# Patient Record
Sex: FEMALE | Employment: FULL TIME | URBAN - METROPOLITAN AREA
[De-identification: names, ages, dates, MRNs, and addresses within clinical notes are randomized per-mention and may not be internally consistent; named-entity substitution may affect disease eponyms.]

---

## 2020-10-05 ENCOUNTER — TELEPHONE (OUTPATIENT)
Dept: ONCOLOGY | Age: 55
End: 2020-10-05

## 2020-10-05 NOTE — TELEPHONE ENCOUNTER
Left voicemail advising patient that ultrasound is scheduled on Thursday 10/8/2020 at 2:45 pm with an arrival time of 2:15 pm. Requested patient contact the office to confirm appointment.

## 2020-10-08 ENCOUNTER — HOSPITAL ENCOUNTER (OUTPATIENT)
Dept: ULTRASOUND IMAGING | Age: 55
Discharge: HOME | End: 2020-10-08
Attending: OBSTETRICS & GYNECOLOGY
Payer: COMMERCIAL

## 2020-10-08 DIAGNOSIS — N95.0 POSTMENOPAUSAL BLEEDING: ICD-10-CM

## 2020-10-08 PROCEDURE — 76830 TRANSVAGINAL US NON-OB: CPT

## 2020-10-14 ENCOUNTER — TELEPHONE (OUTPATIENT)
Dept: ONCOLOGY | Age: 55
End: 2020-10-14

## 2020-10-14 NOTE — TELEPHONE ENCOUNTER
Received a call from [employer] with patients final results from recent biopsy. Arslan Nowak from the facility stated she would fax over patients findings today. Confirmed fax number.

## 2020-10-15 ENCOUNTER — TELEPHONE (OUTPATIENT)
Dept: ONCOLOGY | Age: 55
End: 2020-10-15

## 2020-10-23 ENCOUNTER — OFFICE VISIT (OUTPATIENT)
Dept: ONCOLOGY | Age: 55
End: 2020-10-23
Payer: COMMERCIAL

## 2020-10-23 ENCOUNTER — TRANSCRIBE ORDER (OUTPATIENT)
Dept: REGISTRATION | Age: 55
End: 2020-10-23

## 2020-10-23 ENCOUNTER — HOSPITAL ENCOUNTER (OUTPATIENT)
Dept: LAB | Age: 55
Discharge: HOME | End: 2020-10-23
Payer: COMMERCIAL

## 2020-10-23 ENCOUNTER — HOSPITAL ENCOUNTER (OUTPATIENT)
Dept: PREADMISSION TESTING | Age: 55
Discharge: HOME | End: 2020-10-23
Payer: COMMERCIAL

## 2020-10-23 VITALS
OXYGEN SATURATION: 98 % | BODY MASS INDEX: 48.82 KG/M2 | DIASTOLIC BLOOD PRESSURE: 80 MMHG | HEIGHT: 65 IN | HEART RATE: 71 BPM | WEIGHT: 293 LBS | SYSTOLIC BLOOD PRESSURE: 138 MMHG | TEMPERATURE: 97 F | RESPIRATION RATE: 14 BRPM

## 2020-10-23 DIAGNOSIS — C54.1 ENDOMETRIAL SARCOMA (HCC): ICD-10-CM

## 2020-10-23 DIAGNOSIS — E66.01 MORBID OBESITY (HCC): Primary | ICD-10-CM

## 2020-10-23 DIAGNOSIS — E66.01 OBESITY, MORBID (HCC): ICD-10-CM

## 2020-10-23 DIAGNOSIS — C54.1 ENDOMETRIAL CANCER (HCC): ICD-10-CM

## 2020-10-23 DIAGNOSIS — C54.1 ENDOMETRIAL CANCER (HCC): Primary | ICD-10-CM

## 2020-10-23 LAB
ABO + RH BLD: NORMAL
ATRIAL RATE: 73 BPM
BLOOD GROUP ANTIBODIES SERPL: NORMAL
CALCULATED P AXIS, ECG09: 62 DEGREES
CALCULATED R AXIS, ECG10: 72 DEGREES
CALCULATED T AXIS, ECG11: 44 DEGREES
DIAGNOSIS, 93000: NORMAL
P-R INTERVAL, ECG05: 152 MS
Q-T INTERVAL, ECG07: 434 MS
QRS DURATION, ECG06: 72 MS
QTC CALCULATION (BEZET), ECG08: 478 MS
SENTARA SPECIMEN COL,SENBCF: NORMAL
SPECIMEN EXP DATE BLD: NORMAL
VENTRICULAR RATE, ECG03: 73 BPM

## 2020-10-23 PROCEDURE — 99214 OFFICE O/P EST MOD 30 MIN: CPT | Performed by: OBSTETRICS & GYNECOLOGY

## 2020-10-23 PROCEDURE — 99001 SPECIMEN HANDLING PT-LAB: CPT

## 2020-10-23 PROCEDURE — 93005 ELECTROCARDIOGRAM TRACING: CPT

## 2020-10-23 PROCEDURE — 86900 BLOOD TYPING SEROLOGIC ABO: CPT

## 2020-10-23 RX ORDER — METAPROTERENOL SULFATE 20 MG
TABLET ORAL
COMMUNITY

## 2020-10-23 RX ORDER — SPIRONOLACTONE 25 MG
TABLET ORAL
COMMUNITY

## 2020-10-23 NOTE — H&P (VIEW-ONLY)
[address], [address], [address] [facility] 
[address] [facility], [address] [place], [address] 
 [phone] Abby Bosworth DO Patient ID: 
Name:  Anahi Landon MRN:  [id number] :  1965/54 y.o. Date:  10/23/2020 HISTORY OF PRESENT ILLNESS: 
Anahi Landon is a 62 y.o.  postmenopausal female [facility] by [facility] post[facility] bleeding. Pt states she developed spotting in February/march. Was set up to see gyn but with COVID she was unable. Had EMB and ultrasound and here to review results. Still having some bleeding. Labs: 
Pathology Specimen A-Endometrial Biopsy, endometrium: WELL TO  
MODERATELY DIFFERENTIATED ENDOMETRIOID ADENOCARCINOMA WITH  
SQUAMOUS METAPLASIA (FIGO I-II). Imaging TVUS 
FINDINGS: Multiple images from a transabdominal and endovaginal pelvic 
ultrasound study. 
  
The uterus measures 15.3 x 15.4 x 15.3 cm. Uterine fibroid at the lower uterine 
segment measures 5.1 x 3.6 x 3.6 cm. Posterior fibroid of the body measures 5.1 
x 4.0 x 4.5 cm. 
  
Visualization of the endometrium is limited. Visualized portions measure up to 
1.7 cm. 
  
Right and left ovaries not seen. No adnexal masses. No free fluid in the pelvis. 
  
IMPRESSION IMPRESSION: 
  
Fibroid uterus. 
  
Thickened endometrium in the visualized portions. Correlate for endometrial 
hyperplasia or malignancy. 
  
Nonvisualization of the ovaries could be due to positioning, absence, or limited 
visualization due to bowel gas. No adnexal mass. ROS:  
As above Patient Active Problem List  
 Diagnosis Date Noted  Obesity, morbid ([facility] [address] 10/23/2020  S/P appendectomy 2011  Fibroid, uterine 2011  Colon polyps 2010  
 HTN (hypertension) 2010  DUB (dysfunctional uterine bleeding) 2010  Obesity  Hemorrhoid Past Medical History:  
Diagnosis Date  Abdominal pain LLQ  >> GI  
 Elevated blood pressure  Hypertension  Obesity Past Surgical History:  
Procedure Laterality Date  HX APPENDECTOMY  2011 Dr. Nadine Rae  HX GI    
 HX GYN    
 BTL  
 HX GYN    
   HX GYN    
 etopic pregnancy  HX HEENT Tonsilectomy OB History No obstetric history on file. Social History Tobacco Use  Smoking status: Current Every Day Smoker Packs/day: 1.00  Smokeless tobacco: Never Used  Tobacco comment: 4 months Quit for 3 years Substance Use Topics  Alcohol use: No  
  
Family History Problem Relation Age of Onset  Hypertension Mother  Cancer Mother Pancreatic  Colon Cancer Sister 36 Current Outpatient Medications Medication Sig  potassium 99 mg tablet Take 99 mg by mouth daily.  Vjmswkys-Mdct-Tzfjwi-Hyalur Ac 026-858-64-2 mg cap Take  by mouth.  Calcium-Cholecalciferol, D3, (Calcium 600 with Vitamin D3) 600 mg(1,500mg) -400 unit chew Take  by mouth.  lisinopril (PRINIVIL, ZESTRIL) 10 mg tablet Take 1 Tab by mouth two (2) times a day. No current facility-administered medications for this visit. Allergies Allergen Reactions  Pcn [Penicillins] Other (comments) Paralyzes Left side OBJECTIVE: 
 
Physical Exam 
VITAL SIGNS: Visit Vitals /80 (BP 1 Location: Left arm, BP Patient Position: Sitting) Pulse 71 Temp 97 °F (36.1 °C) (Temporal) Resp 14 Ht 5' 5\" (1.651 m) Wt 138.8 kg (306 lb) SpO2 98% BMI 50.92 kg/m² GENERAL ROSIBEL: in no apparent distress and well developed and well nourished IMPRESSION/PLAN: 
1.  Grade 1-2  Endometrial Cancer 
 -reviewed her pathology and imaging and discussed likelihood that it is confined to uterus and favorable prognosis 
 -discussed recommendation for hysterectomy/bso sentinel lymph node biopsy -discussed robotic approach but explained given size of uterus there is good change she may need larger incision 
 -briefly discussed possible although unlikely need for adjuvant treatment 
 -Risks, benefits and alternatives of surgery discussed in detail 
 -all of patients questions and concerns addressed The total time spent was 60 minutes regarding this patients diagnosis of endometrial cancer and >50% of this time was spent counseling and coordinating care Renu Chowdhury DO Gynecologic Oncology 10/23/414018:47 AM

## 2020-10-23 NOTE — PROGRESS NOTES
[facility]  [address] [place] [address], [address], [address] [facility]  [address] [facility], [address] [facility] [place]  [place], [address]   [phone]  Tosha Solomon DO      Patient ID:  Name:  Pauline Metz  MRN:  [id number]  :  1965/54 y.o. Date:  10/23/2020      HISTORY OF PRESENT ILLNESS:  Pauline Metz is a 62 y.o.  postmenopausal female [facility] by [facility] for postmenopausal bleeding. Pt states she developed spotting in February/march. Was set up to see gyn but with COVID she was unable. Had EMB and ultrasound and here to review results. Still having some bleeding. Labs:  Pathology  Specimen A-Endometrial Biopsy, endometrium: WELL TO   MODERATELY DIFFERENTIATED ENDOMETRIOID ADENOCARCINOMA WITH   SQUAMOUS METAPLASIA (FIGO I-II). Imaging  TVUS  FINDINGS: Multiple images from a transabdominal and endovaginal pelvic  ultrasound study.     The uterus measures 15.3 x 15.4 x 15.3 cm. Uterine fibroid at the lower uterine  segment measures 5.1 x 3.6 x 3.6 cm. Posterior fibroid of the body measures 5.1  x 4.0 x 4.5 cm.     Visualization of the endometrium is limited. Visualized portions measure up to  1.7 cm.     Right and left ovaries not seen. No adnexal masses. No free fluid in the pelvis.     IMPRESSION  IMPRESSION:     Fibroid uterus.     Thickened endometrium in the visualized portions. Correlate for endometrial  hyperplasia or malignancy.     Nonvisualization of the ovaries could be due to positioning, absence, or limited  visualization due to bowel gas. No adnexal mass.        ROS:   As above      Patient Active Problem List    Diagnosis Date Noted    Obesity, morbid ([address] 10/23/2020    S/P appendectomy 2011    Fibroid, uterine 2011    Colon polyps 2010    HTN (hypertension) 2010    DUB (dysfunctional uterine bleeding) 2010    Obesity     Hemorrhoid      Past Medical History:   Diagnosis Date    Abdominal pain     LLQ  >> GI    Elevated blood pressure     Hypertension     Obesity       Past Surgical History:   Procedure Laterality Date    HX APPENDECTOMY  2011    Dr. Gretchen Marcial    HX GI      HX GYN      BTL    HX GYN          HX GYN      etopic pregnancy    HX HEENT      Tonsilectomy      OB History    No obstetric history on file. Social History     Tobacco Use    Smoking status: Current Every Day Smoker     Packs/day: 1.00    Smokeless tobacco: Never Used    Tobacco comment: 4 months Quit for 3 years   Substance Use Topics    Alcohol use: No      Family History   Problem Relation Age of Onset    Hypertension Mother     Cancer Mother         Pancreatic     Colon Cancer Sister 36      Current Outpatient Medications   Medication Sig    potassium 99 mg tablet Take 99 mg by mouth daily.  Kzzdvrbm-Waol-Nvnqjx-Hyalur Ac [id number] mg cap Take  by mouth.  Calcium-Cholecalciferol, D3, (Calcium 600 with Vitamin D3) 600 mg(1,500mg) -400 unit chew Take  by mouth.  lisinopril (PRINIVIL, ZESTRIL) 10 mg tablet Take 1 Tab by mouth two (2) times a day. No current facility-administered medications for this visit.       Allergies   Allergen Reactions    Pcn [Penicillins] Other (comments)     Paralyzes Left side           OBJECTIVE:    Physical Exam  VITAL SIGNS: Visit Vitals  /80 (BP 1 Location: Left arm, BP Patient Position: Sitting)   Pulse 71   Temp 97 °F (36.1 °C) (Temporal)   Resp 14   Ht 5' 5\" (1.651 m)   Wt 138.8 kg (306 lb)   SpO2 98%   BMI 50.92 kg/m²      GENERAL ROSIBEL: in no apparent distress and well developed and well nourished           IMPRESSION/PLAN:  1.  Grade 1-2  Endometrial Cancer   -reviewed her pathology and imaging and discussed likelihood that it is confined to uterus and favorable prognosis   -discussed recommendation for hysterectomy/bso sentinel lymph node biopsy   -discussed robotic approach but explained given size of uterus there is good change she may need larger incision   -briefly discussed possible although unlikely need for adjuvant treatment   -Risks, benefits and alternatives of surgery discussed in detail   -all of patients questions and concerns addressed            The total time spent was 60 minutes regarding this patients diagnosis of endometrial cancer and >50% of this time was spent counseling and coordinating care    [facility] On[facility]  10/23/687105:47 AM

## 2020-10-23 NOTE — PROGRESS NOTES
Sima Benitez is a 62 y.o. female presents in office for test results, she is accompanied by her . Chief Complaint   Patient presents with    Results      Pt c/o back spasms one day this week. Do you have any unusual vaginal bleeding, discharge or irritation? Pt c/o vaginal spotting, using panty liners. Do you have any changes in your bowel movements? No  Have you been experiencing nausea or vomiting? No  Have you been experiencing any continuous or worsening abdominal pain? No  Any urinary burning? No    Visit Vitals  /80 (BP 1 Location: Left arm, BP Patient Position: Sitting)   Pulse 71   Temp 97 °F (36.1 °C) (Temporal)   Resp 14   Ht 5' 5\" (1.651 m)   Wt 138.8 kg (306 lb)   SpO2 98%   BMI 50.92 kg/m²         1. Have you been to the ER, urgent care clinic since your last visit? Hospitalized since your last visit? No    2. Have you seen or consulted any other health care providers outside of the [address] [place] [address] since your last visit? Include any pap smears or colon screening.  No    3 most recent PHQ Screens 10/23/2020   Little interest or pleasure in doing things Not at all   Feeling down, depressed, irritable, or hopeless Not at all   Total Score PHQ 2 0         Learning Assessment 10/23/2020   PRIMARY LEARNER Patient   BARRIERS PRIMARY LEARNER NONE   CO-LEARNER CAREGIVER No   PRIMARY LANGUAGE ENGLISH   LEARNER PREFERENCE PRIMARY DEMONSTRATION   ANSWERED BY Patient   RELATIONSHIP SELF

## 2020-10-23 NOTE — LETTER
10/23/20 Patient: Leisa Chaudhry YOB: 1965 Date of Visit: 10/23/2020 Aretha Welch MD 
[address] [place] [id number] VIA Facsimile: [phone] Dear Aretha Welch MD, [facility]. My notes for this consultation are attached. If you have questions, please do not hesitate to call me. I look forward to following your patient along with you. Sincerely, Nilay Moreno MD

## 2020-10-25 LAB
A-G RATIO,AGRAT: 1.7 RATIO (ref 1.1–2.6)
ALBUMIN SERPL-MCNC: 4.2 G/DL (ref 3.5–5)
ALP SERPL-CCNC: 63 U/L (ref 25–115)
ALT SERPL-CCNC: 14 U/L (ref 5–40)
ANION GAP SERPL CALC-SCNC: 12.8 MMOL/L (ref 3–15)
AST SERPL W P-5'-P-CCNC: 18 U/L (ref 10–37)
AVG GLU, 10930: 123 MG/DL (ref 91–123)
BILIRUB SERPL-MCNC: 0.1 MG/DL (ref 0.2–1.2)
BUN SERPL-MCNC: 14 MG/DL (ref 6–22)
CALCIUM SERPL-MCNC: 9.1 MG/DL (ref 8.4–10.5)
CHLORIDE SERPL-SCNC: 101 MMOL/L (ref 98–110)
CO2 SERPL-SCNC: 28 MMOL/L (ref 20–32)
CREAT SERPL-MCNC: 1.1 MG/DL (ref 0.5–1.2)
ERYTHROCYTE [DISTWIDTH] IN BLOOD BY AUTOMATED COUNT: 15.8 % (ref 10–15.5)
GFRAA, 66117: 59.8
GFRNA, 66118: 49.3
GLOBULIN,GLOB: 2.5 G/DL (ref 2–4)
GLUCOSE SERPL-MCNC: 116 MG/DL (ref 70–99)
HBA1C MFR BLD HPLC: 5.9 % (ref 4.8–5.6)
HCT VFR BLD AUTO: 42.9 % (ref 35.1–48)
HGB BLD-MCNC: 13.3 G/DL (ref 11.7–16)
MCH RBC QN AUTO: 31 PG (ref 26–34)
MCHC RBC AUTO-ENTMCNC: 31 G/DL (ref 31–36)
MCV RBC AUTO: 101 FL (ref 81–99)
PLATELET # BLD AUTO: 382 K/UL (ref 140–440)
PMV BLD AUTO: 10.2 FL (ref 9–13)
POTASSIUM SERPL-SCNC: 3.9 MMOL/L (ref 3.5–5.5)
PROT SERPL-MCNC: 6.7 G/DL (ref 6.4–8.3)
RBC # BLD AUTO: 4.25 M/UL (ref 3.8–5.2)
SODIUM SERPL-SCNC: 142 MMOL/L (ref 133–145)
WBC # BLD AUTO: 6.9 K/UL (ref 4–11)

## 2020-10-30 ENCOUNTER — HOSPITAL ENCOUNTER (OUTPATIENT)
Dept: PREADMISSION TESTING | Age: 55
Discharge: HOME | End: 2020-10-30
Payer: COMMERCIAL

## 2020-10-30 VITALS — HEIGHT: 65 IN | BODY MASS INDEX: 48.82 KG/M2 | WEIGHT: 293 LBS

## 2020-10-30 PROCEDURE — 87635 SARS-COV-2 COVID-19 AMP PRB: CPT

## 2020-10-30 RX ORDER — ASPIRIN 325 MG
325 TABLET ORAL DAILY
COMMUNITY

## 2020-10-30 RX ORDER — HEPARIN SODIUM 5000 [USP'U]/ML
5000 INJECTION, SOLUTION INTRAVENOUS; SUBCUTANEOUS ONCE
Status: CANCELLED | OUTPATIENT
Start: 2020-10-30 | End: 2020-10-30

## 2020-10-30 RX ORDER — GENTAMICIN SULFATE 60 MG/50ML
120 INJECTION, SOLUTION INTRAVENOUS ONCE
Status: CANCELLED | OUTPATIENT
Start: 2020-10-30 | End: 2020-10-30

## 2020-10-30 RX ORDER — SODIUM CHLORIDE, SODIUM LACTATE, POTASSIUM CHLORIDE, CALCIUM CHLORIDE 600; 310; 30; 20 MG/100ML; MG/100ML; MG/100ML; MG/100ML
125 INJECTION, SOLUTION INTRAVENOUS CONTINUOUS
Status: CANCELLED | OUTPATIENT
Start: 2020-10-30

## 2020-10-30 NOTE — PERIOP NOTES
Had covid test today. Will self quarantine until after surgery. Aware to use CHG and be NPO. Will refrain from tobacco use 24hrs prior to [place].  Aware to pack light bag and leave valuables at home

## 2020-10-31 LAB — SARS-COV-2, COV2NT: NOT DETECTED

## 2020-11-02 ENCOUNTER — OFFICE VISIT (OUTPATIENT)
Dept: ONCOLOGY | Age: 55
End: 2020-11-02

## 2020-11-02 DIAGNOSIS — Z71.89 SURGICAL COUNSELING VISIT: Primary | ICD-10-CM

## 2020-11-02 NOTE — PROGRESS NOTES
Reviewed information packet for a Robotic assisted laparoscopic hysterectomy, bilateral salpingo-oophorectomy scheduled on 11/5/2020 at 9:30 am with an arrival time of 7:30 am. Patient was given information packet that included pre-op and post-op instructions as well as the scheduled date, start time, arrival time, and length of the surgery and signed the consent form. Patient expressed understanding and had no further questions. Patient was encouraged to call if they have questions later. The patient was counseled at length about the risks of annabel Covid-19 during their perioperative period and any recovery window from their procedure. The patient was made aware that annabel Covid-19  may worsen their prognosis for recovering from their procedure and lend to a higher morbidity and/or mortality risk. All material risks, benefits, and reasonable alternatives including postponing the procedure were discussed. The patient does wish to proceed with the procedure at this time.

## 2020-11-04 ENCOUNTER — TELEPHONE (OUTPATIENT)
Dept: ONCOLOGY | Age: 55
End: 2020-11-04

## 2020-11-05 ENCOUNTER — ANESTHESIA (OUTPATIENT)
Dept: SURGERY | Age: 55
End: 2020-11-05
Payer: COMMERCIAL

## 2020-11-05 ENCOUNTER — HOSPITAL ENCOUNTER (OUTPATIENT)
Age: 55
Setting detail: OBSERVATION
Discharge: HOME | End: 2020-11-07
Attending: OBSTETRICS & GYNECOLOGY | Admitting: OBSTETRICS & GYNECOLOGY
Payer: COMMERCIAL

## 2020-11-05 ENCOUNTER — ANESTHESIA EVENT (OUTPATIENT)
Dept: SURGERY | Age: 55
End: 2020-11-05
Payer: COMMERCIAL

## 2020-11-05 DIAGNOSIS — G89.18 POST-OPERATIVE PAIN: Primary | ICD-10-CM

## 2020-11-05 DIAGNOSIS — G89.18 POSTOPERATIVE PAIN: Primary | ICD-10-CM

## 2020-11-05 DIAGNOSIS — C54.1 ENDOMETRIAL CANCER (HCC): ICD-10-CM

## 2020-11-05 LAB — GLUCOSE BLD STRIP.AUTO-MCNC: 103 MG/DL (ref 70–110)

## 2020-11-05 PROCEDURE — 88360 TUMOR IMMUNOHISTOCHEM/MANUAL: CPT

## 2020-11-05 PROCEDURE — C9290 INJ, BUPIVACAINE LIPOSOME: HCPCS | Performed by: OBSTETRICS & GYNECOLOGY

## 2020-11-05 PROCEDURE — 77030040830 HC CATH URETH FOL MDII -A: Performed by: OBSTETRICS & GYNECOLOGY

## 2020-11-05 PROCEDURE — 82962 GLUCOSE BLOOD TEST: CPT

## 2020-11-05 PROCEDURE — 77010033678 HC OXYGEN DAILY

## 2020-11-05 PROCEDURE — 2709999900 HC NON-CHARGEABLE SUPPLY: Performed by: OBSTETRICS & GYNECOLOGY

## 2020-11-05 PROCEDURE — 77030011264 HC ELECTRD BLD EXT COVD -A: Performed by: OBSTETRICS & GYNECOLOGY

## 2020-11-05 PROCEDURE — 77030002966 HC SUT PDS J&J -A: Performed by: OBSTETRICS & GYNECOLOGY

## 2020-11-05 PROCEDURE — 88307 TISSUE EXAM BY PATHOLOGIST: CPT

## 2020-11-05 PROCEDURE — 74011000250 HC RX REV CODE- 250: Performed by: NURSE ANESTHETIST, CERTIFIED REGISTERED

## 2020-11-05 PROCEDURE — 99218 HC RM OBSERVATION: CPT

## 2020-11-05 PROCEDURE — 74011250637 HC RX REV CODE- 250/637: Performed by: OBSTETRICS & GYNECOLOGY

## 2020-11-05 PROCEDURE — 77030002933 HC SUT MCRYL J&J -A: Performed by: OBSTETRICS & GYNECOLOGY

## 2020-11-05 PROCEDURE — 77030035277 HC OBTRTR BLDELSS DISP INTU -B: Performed by: OBSTETRICS & GYNECOLOGY

## 2020-11-05 PROCEDURE — 88331 PATH CONSLTJ SURG 1 BLK 1SPC: CPT

## 2020-11-05 PROCEDURE — 38571 LAPAROSCOPY LYMPHADENECTOMY: CPT | Performed by: OBSTETRICS & GYNECOLOGY

## 2020-11-05 PROCEDURE — 88342 IMHCHEM/IMCYTCHM 1ST ANTB: CPT

## 2020-11-05 PROCEDURE — 77030010031 HC SCIS ENDOSC MPLR J&J -C: Performed by: OBSTETRICS & GYNECOLOGY

## 2020-11-05 PROCEDURE — 58573 TLH W/T/O UTERUS OVER 250 G: CPT | Performed by: OBSTETRICS & GYNECOLOGY

## 2020-11-05 PROCEDURE — 77030026102 HC DEV TISS ENSEAL G2 J&J -F: Performed by: OBSTETRICS & GYNECOLOGY

## 2020-11-05 PROCEDURE — 76010000877 HC OR TIME 2.5 TO 3HR INTENSV - TIER 2: Performed by: OBSTETRICS & GYNECOLOGY

## 2020-11-05 PROCEDURE — 88309 TISSUE EXAM BY PATHOLOGIST: CPT

## 2020-11-05 PROCEDURE — 77030025805 HC MANIP UTER RUMI COOP -B: Performed by: OBSTETRICS & GYNECOLOGY

## 2020-11-05 PROCEDURE — 77030006643: Performed by: ANESTHESIOLOGY

## 2020-11-05 PROCEDURE — 77030020703 HC SEAL CANN DISP INTU -B: Performed by: OBSTETRICS & GYNECOLOGY

## 2020-11-05 PROCEDURE — 74011000254 HC RX REV CODE- 254: Performed by: OBSTETRICS & GYNECOLOGY

## 2020-11-05 PROCEDURE — 77030008477 HC STYL SATN SLP COVD -A: Performed by: ANESTHESIOLOGY

## 2020-11-05 PROCEDURE — 74011250636 HC RX REV CODE- 250/636: Performed by: ANESTHESIOLOGY

## 2020-11-05 PROCEDURE — 88341 IMHCHEM/IMCYTCHM EA ADD ANTB: CPT

## 2020-11-05 PROCEDURE — 77030014064 HC TIP MANIP UTER COOP -C: Performed by: OBSTETRICS & GYNECOLOGY

## 2020-11-05 PROCEDURE — 74011250636 HC RX REV CODE- 250/636: Performed by: NURSE ANESTHETIST, CERTIFIED REGISTERED

## 2020-11-05 PROCEDURE — 77030040361 HC SLV COMPR DVT MDII -B: Performed by: OBSTETRICS & GYNECOLOGY

## 2020-11-05 PROCEDURE — 74011250636 HC RX REV CODE- 250/636: Performed by: OBSTETRICS & GYNECOLOGY

## 2020-11-05 PROCEDURE — 77030008462 HC STPLR SKN PROX J&J -A: Performed by: OBSTETRICS & GYNECOLOGY

## 2020-11-05 PROCEDURE — 76060000036 HC ANESTHESIA 2.5 TO 3 HR: Performed by: OBSTETRICS & GYNECOLOGY

## 2020-11-05 PROCEDURE — 77030037241 HC PRT ACC BLDLSS AIRSEAL CNMD -B: Performed by: OBSTETRICS & GYNECOLOGY

## 2020-11-05 PROCEDURE — 77030020782 HC GWN BAIR PAWS FLX 3M -B: Performed by: OBSTETRICS & GYNECOLOGY

## 2020-11-05 PROCEDURE — 74011000250 HC RX REV CODE- 250: Performed by: OBSTETRICS & GYNECOLOGY

## 2020-11-05 PROCEDURE — 77030031139 HC SUT VCRL2 J&J -A: Performed by: OBSTETRICS & GYNECOLOGY

## 2020-11-05 PROCEDURE — 77030008574 HC TBNG SUC IRR STRY -B: Performed by: OBSTETRICS & GYNECOLOGY

## 2020-11-05 PROCEDURE — 88112 CYTOPATH CELL ENHANCE TECH: CPT

## 2020-11-05 PROCEDURE — 77030008683 HC TU ET CUF COVD -A: Performed by: ANESTHESIOLOGY

## 2020-11-05 PROCEDURE — 74011000272 HC RX REV CODE- 272: Performed by: OBSTETRICS & GYNECOLOGY

## 2020-11-05 PROCEDURE — 77030016151 HC PROTCTR LNS DFOG COVD -B: Performed by: OBSTETRICS & GYNECOLOGY

## 2020-11-05 PROCEDURE — 76210000006 HC OR PH I REC 0.5 TO 1 HR: Performed by: OBSTETRICS & GYNECOLOGY

## 2020-11-05 RX ORDER — FLUMAZENIL 0.1 MG/ML
0.2 INJECTION INTRAVENOUS
Status: DISCONTINUED | OUTPATIENT
Start: 2020-11-05 | End: 2020-11-05 | Stop reason: HOSPADM

## 2020-11-05 RX ORDER — ROCURONIUM BROMIDE 10 MG/ML
INJECTION, SOLUTION INTRAVENOUS AS NEEDED
Status: DISCONTINUED | OUTPATIENT
Start: 2020-11-05 | End: 2020-11-05 | Stop reason: HOSPADM

## 2020-11-05 RX ORDER — ONDANSETRON 2 MG/ML
INJECTION INTRAMUSCULAR; INTRAVENOUS AS NEEDED
Status: DISCONTINUED | OUTPATIENT
Start: 2020-11-05 | End: 2020-11-05 | Stop reason: HOSPADM

## 2020-11-05 RX ORDER — GENTAMICIN SULFATE 60 MG/50ML
120 INJECTION, SOLUTION INTRAVENOUS ONCE
Status: COMPLETED | OUTPATIENT
Start: 2020-11-05 | End: 2020-11-05

## 2020-11-05 RX ORDER — ENOXAPARIN SODIUM 100 MG/ML
40 INJECTION SUBCUTANEOUS EVERY 24 HOURS
Status: DISCONTINUED | OUTPATIENT
Start: 2020-11-06 | End: 2020-11-07 | Stop reason: HOSPADM

## 2020-11-05 RX ORDER — CLINDAMYCIN PHOSPHATE 900 MG/50ML
900 INJECTION, SOLUTION INTRAVENOUS ONCE
Status: COMPLETED | OUTPATIENT
Start: 2020-11-05 | End: 2020-11-05

## 2020-11-05 RX ORDER — SODIUM CHLORIDE 0.9 % (FLUSH) 0.9 %
5-40 SYRINGE (ML) INJECTION EVERY 8 HOURS
Status: DISCONTINUED | OUTPATIENT
Start: 2020-11-05 | End: 2020-11-07 | Stop reason: HOSPADM

## 2020-11-05 RX ORDER — EPHEDRINE SULFATE/0.9% NACL/PF 50 MG/5 ML
SYRINGE (ML) INTRAVENOUS AS NEEDED
Status: DISCONTINUED | OUTPATIENT
Start: 2020-11-05 | End: 2020-11-05 | Stop reason: HOSPADM

## 2020-11-05 RX ORDER — SODIUM CHLORIDE 0.9 % (FLUSH) 0.9 %
5-40 SYRINGE (ML) INJECTION AS NEEDED
Status: DISCONTINUED | OUTPATIENT
Start: 2020-11-05 | End: 2020-11-07 | Stop reason: HOSPADM

## 2020-11-05 RX ORDER — LIDOCAINE HYDROCHLORIDE 20 MG/ML
INJECTION, SOLUTION EPIDURAL; INFILTRATION; INTRACAUDAL; PERINEURAL AS NEEDED
Status: DISCONTINUED | OUTPATIENT
Start: 2020-11-05 | End: 2020-11-05 | Stop reason: HOSPADM

## 2020-11-05 RX ORDER — SODIUM CHLORIDE, SODIUM LACTATE, POTASSIUM CHLORIDE, CALCIUM CHLORIDE 600; 310; 30; 20 MG/100ML; MG/100ML; MG/100ML; MG/100ML
125 INJECTION, SOLUTION INTRAVENOUS CONTINUOUS
Status: DISCONTINUED | OUTPATIENT
Start: 2020-11-05 | End: 2020-11-07 | Stop reason: HOSPADM

## 2020-11-05 RX ORDER — FENTANYL CITRATE 50 UG/ML
INJECTION, SOLUTION INTRAMUSCULAR; INTRAVENOUS AS NEEDED
Status: DISCONTINUED | OUTPATIENT
Start: 2020-11-05 | End: 2020-11-05 | Stop reason: HOSPADM

## 2020-11-05 RX ORDER — NALOXONE HYDROCHLORIDE 0.4 MG/ML
0.4 INJECTION, SOLUTION INTRAMUSCULAR; INTRAVENOUS; SUBCUTANEOUS AS NEEDED
Status: DISCONTINUED | OUTPATIENT
Start: 2020-11-05 | End: 2020-11-07 | Stop reason: HOSPADM

## 2020-11-05 RX ORDER — OXYCODONE AND ACETAMINOPHEN 5; 325 MG/1; MG/1
2 TABLET ORAL
Status: DISCONTINUED | OUTPATIENT
Start: 2020-11-05 | End: 2020-11-07 | Stop reason: HOSPADM

## 2020-11-05 RX ORDER — GLYCOPYRROLATE 0.2 MG/ML
INJECTION INTRAMUSCULAR; INTRAVENOUS AS NEEDED
Status: DISCONTINUED | OUTPATIENT
Start: 2020-11-05 | End: 2020-11-05 | Stop reason: HOSPADM

## 2020-11-05 RX ORDER — KETOROLAC TROMETHAMINE 15 MG/ML
15 INJECTION, SOLUTION INTRAMUSCULAR; INTRAVENOUS
Status: DISPENSED | OUTPATIENT
Start: 2020-11-05 | End: 2020-11-06

## 2020-11-05 RX ORDER — DIPHENHYDRAMINE HCL 25 MG
25 CAPSULE ORAL
Status: DISCONTINUED | OUTPATIENT
Start: 2020-11-05 | End: 2020-11-07 | Stop reason: HOSPADM

## 2020-11-05 RX ORDER — OXYCODONE AND ACETAMINOPHEN 5; 325 MG/1; MG/1
1 TABLET ORAL
Qty: 40 TAB | Refills: 0 | OUTPATIENT
Start: 2020-11-05 | End: 2020-11-07

## 2020-11-05 RX ORDER — OXYCODONE AND ACETAMINOPHEN 5; 325 MG/1; MG/1
1 TABLET ORAL
Status: DISCONTINUED | OUTPATIENT
Start: 2020-11-05 | End: 2020-11-05 | Stop reason: HOSPADM

## 2020-11-05 RX ORDER — PROPOFOL 10 MG/ML
INJECTION, EMULSION INTRAVENOUS AS NEEDED
Status: DISCONTINUED | OUTPATIENT
Start: 2020-11-05 | End: 2020-11-05 | Stop reason: HOSPADM

## 2020-11-05 RX ORDER — MIDAZOLAM HYDROCHLORIDE 1 MG/ML
INJECTION, SOLUTION INTRAMUSCULAR; INTRAVENOUS AS NEEDED
Status: DISCONTINUED | OUTPATIENT
Start: 2020-11-05 | End: 2020-11-05 | Stop reason: HOSPADM

## 2020-11-05 RX ORDER — SODIUM CHLORIDE, SODIUM LACTATE, POTASSIUM CHLORIDE, CALCIUM CHLORIDE 600; 310; 30; 20 MG/100ML; MG/100ML; MG/100ML; MG/100ML
50 INJECTION, SOLUTION INTRAVENOUS CONTINUOUS
Status: DISCONTINUED | OUTPATIENT
Start: 2020-11-05 | End: 2020-11-05

## 2020-11-05 RX ORDER — CLINDAMYCIN PHOSPHATE 900 MG/50ML
900 INJECTION, SOLUTION INTRAVENOUS EVERY 8 HOURS
Status: COMPLETED | OUTPATIENT
Start: 2020-11-05 | End: 2020-11-06

## 2020-11-05 RX ORDER — LISINOPRIL 5 MG/1
10 TABLET ORAL 2 TIMES DAILY
Status: DISCONTINUED | OUTPATIENT
Start: 2020-11-05 | End: 2020-11-07 | Stop reason: HOSPADM

## 2020-11-05 RX ORDER — INDOCYANINE GREEN AND WATER 25 MG
KIT INJECTION AS NEEDED
Status: DISCONTINUED | OUTPATIENT
Start: 2020-11-05 | End: 2020-11-05 | Stop reason: HOSPADM

## 2020-11-05 RX ORDER — GENTAMICIN SULFATE 60 MG/50ML
120 INJECTION, SOLUTION INTRAVENOUS EVERY 8 HOURS
Status: COMPLETED | OUTPATIENT
Start: 2020-11-05 | End: 2020-11-06

## 2020-11-05 RX ORDER — OXYCODONE AND ACETAMINOPHEN 5; 325 MG/1; MG/1
1 TABLET ORAL
Status: DISCONTINUED | OUTPATIENT
Start: 2020-11-05 | End: 2020-11-07 | Stop reason: HOSPADM

## 2020-11-05 RX ORDER — NEOSTIGMINE METHYLSULFATE 1 MG/ML
INJECTION, SOLUTION INTRAVENOUS AS NEEDED
Status: DISCONTINUED | OUTPATIENT
Start: 2020-11-05 | End: 2020-11-05 | Stop reason: HOSPADM

## 2020-11-05 RX ORDER — HEPARIN SODIUM 5000 [USP'U]/ML
5000 INJECTION, SOLUTION INTRAVENOUS; SUBCUTANEOUS ONCE
Status: COMPLETED | OUTPATIENT
Start: 2020-11-05 | End: 2020-11-05

## 2020-11-05 RX ORDER — SODIUM CHLORIDE, SODIUM LACTATE, POTASSIUM CHLORIDE, CALCIUM CHLORIDE 600; 310; 30; 20 MG/100ML; MG/100ML; MG/100ML; MG/100ML
100 INJECTION, SOLUTION INTRAVENOUS CONTINUOUS
Status: DISCONTINUED | OUTPATIENT
Start: 2020-11-05 | End: 2020-11-05 | Stop reason: HOSPADM

## 2020-11-05 RX ORDER — ONDANSETRON 2 MG/ML
4 INJECTION INTRAMUSCULAR; INTRAVENOUS
Status: DISCONTINUED | OUTPATIENT
Start: 2020-11-05 | End: 2020-11-07 | Stop reason: HOSPADM

## 2020-11-05 RX ORDER — FENTANYL CITRATE 50 UG/ML
50 INJECTION, SOLUTION INTRAMUSCULAR; INTRAVENOUS
Status: DISCONTINUED | OUTPATIENT
Start: 2020-11-05 | End: 2020-11-05 | Stop reason: HOSPADM

## 2020-11-05 RX ORDER — ONDANSETRON 2 MG/ML
4 INJECTION INTRAMUSCULAR; INTRAVENOUS ONCE
Status: DISCONTINUED | OUTPATIENT
Start: 2020-11-05 | End: 2020-11-05 | Stop reason: HOSPADM

## 2020-11-05 RX ORDER — NALOXONE HYDROCHLORIDE 0.4 MG/ML
0.4 INJECTION, SOLUTION INTRAMUSCULAR; INTRAVENOUS; SUBCUTANEOUS AS NEEDED
Status: DISCONTINUED | OUTPATIENT
Start: 2020-11-05 | End: 2020-11-05 | Stop reason: HOSPADM

## 2020-11-05 RX ORDER — HYDROMORPHONE HYDROCHLORIDE 1 MG/ML
0.5 INJECTION, SOLUTION INTRAMUSCULAR; INTRAVENOUS; SUBCUTANEOUS
Status: DISCONTINUED | OUTPATIENT
Start: 2020-11-05 | End: 2020-11-05 | Stop reason: HOSPADM

## 2020-11-05 RX ORDER — KETAMINE HYDROCHLORIDE 10 MG/ML
INJECTION, SOLUTION INTRAMUSCULAR; INTRAVENOUS AS NEEDED
Status: DISCONTINUED | OUTPATIENT
Start: 2020-11-05 | End: 2020-11-05 | Stop reason: HOSPADM

## 2020-11-05 RX ORDER — HYDROMORPHONE HYDROCHLORIDE 2 MG/ML
INJECTION, SOLUTION INTRAMUSCULAR; INTRAVENOUS; SUBCUTANEOUS AS NEEDED
Status: DISCONTINUED | OUTPATIENT
Start: 2020-11-05 | End: 2020-11-05 | Stop reason: HOSPADM

## 2020-11-05 RX ADMIN — DIPHENHYDRAMINE HYDROCHLORIDE 25 MG: 25 CAPSULE ORAL at 20:54

## 2020-11-05 RX ADMIN — ROCURONIUM BROMIDE 20 MG: 10 INJECTION, SOLUTION INTRAVENOUS at 14:55

## 2020-11-05 RX ADMIN — KETAMINE HYDROCHLORIDE 20 MG: 10 INJECTION, SOLUTION INTRAMUSCULAR; INTRAVENOUS at 13:31

## 2020-11-05 RX ADMIN — ROCURONIUM BROMIDE 20 MG: 10 INJECTION, SOLUTION INTRAVENOUS at 14:06

## 2020-11-05 RX ADMIN — MIDAZOLAM 2 MG: 1 INJECTION INTRAMUSCULAR; INTRAVENOUS at 12:58

## 2020-11-05 RX ADMIN — OXYCODONE HYDROCHLORIDE AND ACETAMINOPHEN 2 TABLET: 5; 325 TABLET ORAL at 17:27

## 2020-11-05 RX ADMIN — FENTANYL CITRATE 100 MCG: 50 INJECTION, SOLUTION INTRAMUSCULAR; INTRAVENOUS at 13:06

## 2020-11-05 RX ADMIN — SODIUM CHLORIDE, SODIUM LACTATE, POTASSIUM CHLORIDE, AND CALCIUM CHLORIDE 100 ML/HR: 600; 310; 30; 20 INJECTION, SOLUTION INTRAVENOUS at 16:23

## 2020-11-05 RX ADMIN — FENTANYL CITRATE 50 MCG: 50 INJECTION, SOLUTION INTRAMUSCULAR; INTRAVENOUS at 13:34

## 2020-11-05 RX ADMIN — GLYCOPYRROLATE 0.4 MG: 0.2 INJECTION INTRAMUSCULAR; INTRAVENOUS at 15:13

## 2020-11-05 RX ADMIN — KETAMINE HYDROCHLORIDE 30 MG: 10 INJECTION, SOLUTION INTRAMUSCULAR; INTRAVENOUS at 13:06

## 2020-11-05 RX ADMIN — GLYCOPYRROLATE 0.4 MG: 0.2 INJECTION INTRAMUSCULAR; INTRAVENOUS at 15:20

## 2020-11-05 RX ADMIN — OXYCODONE HYDROCHLORIDE AND ACETAMINOPHEN 2 TABLET: 5; 325 TABLET ORAL at 20:54

## 2020-11-05 RX ADMIN — CLINDAMYCIN PHOSPHATE 900 MG: 900 INJECTION, SOLUTION INTRAVENOUS at 21:03

## 2020-11-05 RX ADMIN — FENTANYL CITRATE 50 MCG: 50 INJECTION, SOLUTION INTRAMUSCULAR; INTRAVENOUS at 14:55

## 2020-11-05 RX ADMIN — Medication 2 MG: at 15:20

## 2020-11-05 RX ADMIN — FENTANYL CITRATE 50 MCG: 50 INJECTION, SOLUTION INTRAMUSCULAR; INTRAVENOUS at 16:11

## 2020-11-05 RX ADMIN — GENTAMICIN SULFATE 120 MG: 60 INJECTION, SOLUTION INTRAVENOUS at 13:30

## 2020-11-05 RX ADMIN — SODIUM CHLORIDE, SODIUM LACTATE, POTASSIUM CHLORIDE, AND CALCIUM CHLORIDE 50 ML/HR: 600; 310; 30; 20 INJECTION, SOLUTION INTRAVENOUS at 11:29

## 2020-11-05 RX ADMIN — Medication 10 ML: at 17:00

## 2020-11-05 RX ADMIN — ONDANSETRON HYDROCHLORIDE 4 MG: 2 INJECTION INTRAMUSCULAR; INTRAVENOUS at 13:01

## 2020-11-05 RX ADMIN — SODIUM CHLORIDE, SODIUM LACTATE, POTASSIUM CHLORIDE, AND CALCIUM CHLORIDE 125 ML/HR: 600; 310; 30; 20 INJECTION, SOLUTION INTRAVENOUS at 11:35

## 2020-11-05 RX ADMIN — HEPARIN SODIUM 5000 UNITS: 5000 INJECTION INTRAVENOUS; SUBCUTANEOUS at 11:35

## 2020-11-05 RX ADMIN — LIDOCAINE HYDROCHLORIDE 100 MG: 20 INJECTION, SOLUTION EPIDURAL; INFILTRATION; INTRACAUDAL; PERINEURAL at 13:07

## 2020-11-05 RX ADMIN — CLINDAMYCIN PHOSPHATE 900 MG: 900 INJECTION, SOLUTION INTRAVENOUS at 13:13

## 2020-11-05 RX ADMIN — GENTAMICIN SULFATE 120 MG: 60 INJECTION, SOLUTION INTRAVENOUS at 21:52

## 2020-11-05 RX ADMIN — GLYCOPYRROLATE 0.2 MG: 0.2 INJECTION INTRAMUSCULAR; INTRAVENOUS at 13:01

## 2020-11-05 RX ADMIN — FENTANYL CITRATE 50 MCG: 50 INJECTION, SOLUTION INTRAMUSCULAR; INTRAVENOUS at 15:54

## 2020-11-05 RX ADMIN — ROCURONIUM BROMIDE 50 MG: 10 INJECTION, SOLUTION INTRAVENOUS at 13:09

## 2020-11-05 RX ADMIN — HYDROMORPHONE HYDROCHLORIDE 1 MG: 2 INJECTION, SOLUTION INTRAMUSCULAR; INTRAVENOUS; SUBCUTANEOUS at 14:57

## 2020-11-05 RX ADMIN — Medication 3 MG: at 15:13

## 2020-11-05 RX ADMIN — Medication 10 MG: at 15:11

## 2020-11-05 RX ADMIN — KETOROLAC TROMETHAMINE 15 MG: 15 INJECTION, SOLUTION INTRAMUSCULAR; INTRAVENOUS at 17:00

## 2020-11-05 RX ADMIN — PROPOFOL 200 MG: 10 INJECTION, EMULSION INTRAVENOUS at 13:08

## 2020-11-05 NOTE — PROGRESS NOTES
Problem: Falls - Risk of  Goal: *Absence of Falls  Description: Document Adnre Gutierrez Fall Risk and appropriate interventions in the flowsheet.   Outcome: Progressing Towards Goal  Note: Fall Risk Interventions:            Medication Interventions: Teach patient to arise slowly, Patient to call before getting OOB

## 2020-11-05 NOTE — PERIOP NOTES
TRANSFER - OUT REPORT:    Verbal report given to [facility] RN on [facility]  being transferred to [facility] (unit) for routine progression of care       Report consisted of patients Situation, Background, Assessment and   Recommendations(SBAR). Information from the following report(s) SBAR, Kardex, Procedure Summary, Intake/Output and Cardiac Rhythm SR was reviewed with the receiving nurse. Lines:   Peripheral IV 11/05/20 Posterior;Right Forearm (Active)   Site Assessment Clean, dry, & intact 11/05/20 1606   Phlebitis Assessment 0 11/05/20 1606   Infiltration Assessment 0 11/05/20 1606   Dressing Status Clean, dry, & intact 11/05/20 1606   Dressing Type Tape;Transparent 11/05/20 1606   Hub Color/Line Status Pink; Infusing;Patent 11/05/20 1606   Alcohol Cap Used No 11/05/20 1129       Peripheral IV 11/05/20 Left;Posterior Forearm (Active)   Site Assessment Clean, dry, & intact 11/05/20 1606   Phlebitis Assessment 0 11/05/20 1606   Infiltration Assessment 0 11/05/20 1606   Dressing Status Clean, dry, & intact 11/05/20 1606   Dressing Type Tape;Transparent 11/05/20 1606   Hub Color/Line Status Green;Capped 11/05/20 1606   Alcohol Cap Used No 11/05/20 1138        Opportunity for questions and clarification was provided.       Patient transported with:   O2 @ 2 liters  Registered Nurse Fabian Liu

## 2020-11-05 NOTE — PERIOP NOTES
Reviewed PTA medication list with patient/caregiver and patient/caregiver denies any additional medications. Patient admits to having a responsible adult care for them at home for at least 24 hours after surgery. Patient encouraged to use gown warming system and informed that using said warming gown to regulate body temperature prior to a procedure has been shown to help reduce the risks of blood clots and infection. Dual skin assessment & fall risk band verification completed with A Susan BORJAS.

## 2020-11-05 NOTE — PERIOP NOTES
Bedside report given to Gilmer Jensen (RN), left pt in stable condition.    Visit Vitals  /77   Pulse 66   Temp 97.4 °F (36.3 °C)   Resp 16   Ht 5' 5\" (1.651 m)   Wt 137.6 kg (303 lb 6 oz)   LMP 07/19/2011   SpO2 99%   BMI 50.48 kg/m²

## 2020-11-05 NOTE — ANESTHESIA POSTPROCEDURE EVALUATION
Procedure(s):  ROBOTIC TOTAL LAPAROSCOPIC HYSTERECTOMY, RIGHT SALPINGO OOPHORECTOMY, LYSIS OF ADHESIONS  MINI-LAPAROTOMY. general    Anesthesia Post Evaluation      Multimodal analgesia: multimodal analgesia used between 6 hours prior to anesthesia start to PACU discharge  Patient location during evaluation: PACU  Patient participation: complete - patient participated  Level of consciousness: awake  Pain management: satisfactory to patient  Airway patency: patent  Anesthetic complications: no  Cardiovascular status: hemodynamically stable  Respiratory status: spontaneous ventilation and nonlabored ventilation  Hydration status: acceptable  Post anesthesia nausea and vomiting:  none  Final Post Anesthesia Temperature Assessment:  Normothermia (36.0-37.5 degrees C)      INITIAL Post-op Vital signs:   Vitals Value Taken Time   /66 11/5/2020  4:15 PM   Temp 36.3 °C (97.4 °F) 11/5/2020  4:06 PM   Pulse 68 11/5/2020  4:19 PM   Resp 7 11/5/2020  4:19 PM   SpO2 95 % 11/5/2020  4:19 PM   Vitals shown include unvalidated device data.

## 2020-11-05 NOTE — PROGRESS NOTES
1700  TRANSFER - IN REPORT:    Verbal report received from Kay Fishman RN on [facility]  being received from PACU for routine post - op      Report consisted of patients Situation, Background, Assessment and   Recommendations(SBAR). Information from the following report(s) SBAR, Kardex, OR Summary, Procedure Summary, MAR and Recent Results was reviewed with the receiving nurse. Opportunity for questions and clarification was provided. Assessment completed upon patients arrival to unit and care assumed. Pt AOX4, 2L NC. Lung sounds clear, bowel sounds active. Pt has midline incision that is enforced with staples and mepilex foam dressing as well as trocar sites x3 enforced with band-aids, all CDI. Pt also has pete catheter in place that is patent and draining clear, pedrito-colored urine. Pt educated on indications for SCDs and verbalizes understanding. Pt educated on the beginning of clear liquid diet and advancement that will occur based on toleration. Will continue education upon awakening. Orders in place to notify provider based on VS parameters and if excessive bleeding is present. 1730  Pt received PRN Toradol (1mg) for 9/10 pain in the abdomen. Pt refused ice pack. Pt educated on the ability to alternate between PRN Toradol and PRN Percocet for pain management. 200  Pt quietly resting with  at the bedside. Pt reports 6/10 pain in the abdomen but refuses ice pack or any non-pharmacological pain management technique. Pt states she will await dose of next PRN pain medication dose. Pete output of 200ml drained. 1950  Bedside and Verbal shift change report given by GARRETT Soriano RN (off going nurse) to FRANKIE Meeks RN (on coming nurse). Report included the following information SBAR, Kardex, OR Summary, Intake/Output and MAR.

## 2020-11-05 NOTE — BRIEF OP NOTE
Brief Postoperative Note    Patient: Ettie Simmonds  YOB: 1965  MRN: [id number]    Date of Procedure: 11/5/2020     Pre-Op Diagnosis: ENDOMETRIAL CANCER    Post-Op Diagnosis: Same as preoperative diagnosis. Procedure(s):  ROBOTIC TOTAL LAPAROSCOPIC HYSTERECTOMY, BILATERAL SALPINGO OOPHORECTOMY, LYSIS OF ADHESIONS, STAGING    Surgeon(s):  Delano Irving MD    Surgical Assistant: Surg Asst-1: Jason DENNY    Anesthesia: General     Estimated Blood Loss (mL): 938    Complications: None    Specimens:   ID Type Source Tests Collected by Time Destination   1 : LEFT PELVIC SENTINEL LYMPH NODE Preservative Pelvis  Delano Irving MD 11/5/2020 1431 Pathology   2 : RIGHT PELVIC SENTINEL LYMPH NODE Preservative Pelvis  Delano Irving MD 11/5/2020 1456 Pathology   3 : CERVIX, UTERUS, RIGHT FALLOPIAN TUBE AND RIGHT OVARY Frozen Section Uterus with Fallopian Tube & Ovary  Delano Irving MD 11/5/2020 1501 Pathology   1 : ABDOMINAL WASHINGS Fresh Abdomen  Delano Irving MD 11/5/2020 1344 Cytology        Implants: * No implants in log *    Drains: * No LDAs found *    Findings: 16 wk uterus, normal right tube/ovary. Omental adhesions to anterior abdominal wall.      Electronically Signed by Richar Ortiz MD on 11/5/2020 at 3:13 PM

## 2020-11-05 NOTE — ANESTHESIA PREPROCEDURE EVALUATION
Relevant Problems   No relevant active problems       Anesthetic History   No history of anesthetic complications            Review of Systems / Medical History  Patient summary reviewed, nursing notes reviewed and pertinent labs reviewed    Pulmonary          Smoker  Asthma : well controlled  Pertinent negatives: No COPD, recent URI and sleep apnea  Comments: No current issues with asthma, abstained smoking DOS   Neuro/Psych   Within defined limits           Cardiovascular    Hypertension: well controlled            Pertinent negatives: No past MI, CAD, PAD, dysrhythmias, angina and CHF  Exercise tolerance: >4 METS     GI/Hepatic/Renal  Within defined limits              Endo/Other        Morbid obesity  Pertinent negatives: No diabetes, hypothyroidism, hyperthyroidism and blood dyscrasia   Other Findings              Physical Exam    Airway  Mallampati: III  TM Distance: 4 - 6 cm  Neck ROM: normal range of motion   Mouth opening: Normal     Cardiovascular  Regular rate and rhythm,  S1 and S2 normal,  no murmur, click, rub, or gallop             Dental         Pulmonary  Breath sounds clear to auscultation               Abdominal  GI exam deferred       Other Findings            Anesthetic Plan    ASA: 3  Anesthesia type: general          Induction: Intravenous  Anesthetic plan and risks discussed with: Patient      GA/OETT

## 2020-11-05 NOTE — INTERVAL H&P NOTE
Update History & Physical 
 
The Patient's History and Physical of October 23,  was reviewed with the patient and I examined the patient. There was no change. The surgical site was confirmed by the patient and me. Plan:  The risk, benefits, expected outcome, and alternative to the recommended procedure have been discussed with the patient. Patient understands and wants to proceed with the procedure.  
 
Electronically signed by Nikki Palacios MD on 11/5/2020 at 11:57 AM

## 2020-11-06 ENCOUNTER — HOSPITAL ENCOUNTER (OUTPATIENT)
Dept: ULTRASOUND IMAGING | Age: 55
Discharge: HOME | End: 2020-11-06
Attending: NURSE PRACTITIONER | Admitting: OBSTETRICS & GYNECOLOGY
Payer: COMMERCIAL

## 2020-11-06 LAB
ANION GAP SERPL CALC-SCNC: 3 MMOL/L (ref 3–18)
ANION GAP SERPL CALC-SCNC: 6 MMOL/L (ref 3–18)
BASOPHILS # BLD: 0 K/UL (ref 0–0.1)
BASOPHILS NFR BLD: 0 % (ref 0–2)
BUN SERPL-MCNC: 18 MG/DL (ref 7–18)
BUN SERPL-MCNC: 19 MG/DL (ref 7–18)
BUN/CREAT SERPL: 11 (ref 12–20)
BUN/CREAT SERPL: 12 (ref 12–20)
CALCIUM SERPL-MCNC: 8 MG/DL (ref 8.5–10.1)
CALCIUM SERPL-MCNC: 8.2 MG/DL (ref 8.5–10.1)
CHLORIDE SERPL-SCNC: 103 MMOL/L (ref 100–111)
CHLORIDE SERPL-SCNC: 105 MMOL/L (ref 100–111)
CO2 SERPL-SCNC: 28 MMOL/L (ref 21–32)
CO2 SERPL-SCNC: 30 MMOL/L (ref 21–32)
CREAT SERPL-MCNC: 1.59 MG/DL (ref 0.6–1.3)
CREAT SERPL-MCNC: 1.59 MG/DL (ref 0.6–1.3)
DIFFERENTIAL METHOD BLD: ABNORMAL
EOSINOPHIL # BLD: 0 K/UL (ref 0–0.4)
EOSINOPHIL NFR BLD: 0 % (ref 0–5)
ERYTHROCYTE [DISTWIDTH] IN BLOOD BY AUTOMATED COUNT: 15.3 % (ref 11.6–14.5)
GLUCOSE SERPL-MCNC: 109 MG/DL (ref 74–99)
GLUCOSE SERPL-MCNC: 125 MG/DL (ref 74–99)
HCT VFR BLD AUTO: 35.4 % (ref 35–45)
HGB BLD-MCNC: 11.5 G/DL (ref 12–16)
LYMPHOCYTES # BLD: 1.7 K/UL (ref 0.9–3.6)
LYMPHOCYTES NFR BLD: 22 % (ref 21–52)
MCH RBC QN AUTO: 30.9 PG (ref 24–34)
MCHC RBC AUTO-ENTMCNC: 32.5 G/DL (ref 31–37)
MCV RBC AUTO: 95.2 FL (ref 74–97)
MONOCYTES # BLD: 0.6 K/UL (ref 0.05–1.2)
MONOCYTES NFR BLD: 7 % (ref 3–10)
NEUTS SEG # BLD: 5.8 K/UL (ref 1.8–8)
NEUTS SEG NFR BLD: 71 % (ref 40–73)
PLATELET # BLD AUTO: 286 K/UL (ref 135–420)
PMV BLD AUTO: 9.1 FL (ref 9.2–11.8)
POTASSIUM SERPL-SCNC: 3.9 MMOL/L (ref 3.5–5.5)
POTASSIUM SERPL-SCNC: 4.2 MMOL/L (ref 3.5–5.5)
RBC # BLD AUTO: 3.72 M/UL (ref 4.2–5.3)
SODIUM SERPL-SCNC: 137 MMOL/L (ref 136–145)
SODIUM SERPL-SCNC: 138 MMOL/L (ref 136–145)
WBC # BLD AUTO: 8.1 K/UL (ref 4.6–13.2)

## 2020-11-06 PROCEDURE — 74011250636 HC RX REV CODE- 250/636: Performed by: NURSE PRACTITIONER

## 2020-11-06 PROCEDURE — 76770 US EXAM ABDO BACK WALL COMP: CPT

## 2020-11-06 PROCEDURE — 74011250637 HC RX REV CODE- 250/637: Performed by: OBSTETRICS & GYNECOLOGY

## 2020-11-06 PROCEDURE — 80048 BASIC METABOLIC PNL TOTAL CA: CPT

## 2020-11-06 PROCEDURE — 99218 HC RM OBSERVATION: CPT

## 2020-11-06 PROCEDURE — 85025 COMPLETE CBC W/AUTO DIFF WBC: CPT

## 2020-11-06 PROCEDURE — 36415 COLL VENOUS BLD VENIPUNCTURE: CPT

## 2020-11-06 PROCEDURE — 74011250636 HC RX REV CODE- 250/636: Performed by: OBSTETRICS & GYNECOLOGY

## 2020-11-06 RX ADMIN — OXYCODONE HYDROCHLORIDE AND ACETAMINOPHEN 2 TABLET: 5; 325 TABLET ORAL at 02:15

## 2020-11-06 RX ADMIN — OXYCODONE HYDROCHLORIDE AND ACETAMINOPHEN 2 TABLET: 5; 325 TABLET ORAL at 20:43

## 2020-11-06 RX ADMIN — Medication 10 ML: at 14:00

## 2020-11-06 RX ADMIN — CLINDAMYCIN PHOSPHATE 900 MG: 900 INJECTION, SOLUTION INTRAVENOUS at 05:00

## 2020-11-06 RX ADMIN — GENTAMICIN SULFATE 120 MG: 60 INJECTION, SOLUTION INTRAVENOUS at 06:32

## 2020-11-06 RX ADMIN — SODIUM CHLORIDE, SODIUM LACTATE, POTASSIUM CHLORIDE, AND CALCIUM CHLORIDE 125 ML/HR: 600; 310; 30; 20 INJECTION, SOLUTION INTRAVENOUS at 02:16

## 2020-11-06 RX ADMIN — OXYCODONE HYDROCHLORIDE AND ACETAMINOPHEN 1 TABLET: 5; 325 TABLET ORAL at 18:40

## 2020-11-06 RX ADMIN — SODIUM CHLORIDE, SODIUM LACTATE, POTASSIUM CHLORIDE, AND CALCIUM CHLORIDE 500 ML: 600; 310; 30; 20 INJECTION, SOLUTION INTRAVENOUS at 10:40

## 2020-11-06 RX ADMIN — Medication 10 ML: at 20:45

## 2020-11-06 RX ADMIN — OXYCODONE HYDROCHLORIDE AND ACETAMINOPHEN 1 TABLET: 5; 325 TABLET ORAL at 13:26

## 2020-11-06 RX ADMIN — OXYCODONE HYDROCHLORIDE AND ACETAMINOPHEN 2 TABLET: 5; 325 TABLET ORAL at 09:12

## 2020-11-06 RX ADMIN — KETOROLAC TROMETHAMINE 15 MG: 15 INJECTION, SOLUTION INTRAMUSCULAR; INTRAVENOUS at 00:08

## 2020-11-06 RX ADMIN — SODIUM CHLORIDE, SODIUM LACTATE, POTASSIUM CHLORIDE, AND CALCIUM CHLORIDE 500 ML: 600; 310; 30; 20 INJECTION, SOLUTION INTRAVENOUS at 04:00

## 2020-11-06 RX ADMIN — ENOXAPARIN SODIUM 40 MG: 40 INJECTION SUBCUTANEOUS at 09:13

## 2020-11-06 RX ADMIN — SODIUM CHLORIDE, SODIUM LACTATE, POTASSIUM CHLORIDE, AND CALCIUM CHLORIDE 125 ML/HR: 600; 310; 30; 20 INJECTION, SOLUTION INTRAVENOUS at 20:25

## 2020-11-06 NOTE — PROGRESS NOTES
Problem: Falls - Risk of  Goal: *Absence of Falls  Description: Document Clydene Bone Fall Risk and appropriate interventions in the flowsheet.   Outcome: Progressing Towards Goal  Note: Fall Risk Interventions:  Mobility Interventions: Patient to call before getting OOB         Medication Interventions: Patient to call before getting OOB, Teach patient to arise slowly    Elimination Interventions: Call light in reach, Patient to call for help with toileting needs

## 2020-11-06 NOTE — PROGRESS NOTES
0715  Bedside and Verbal shift change report given to GARRETT Chadwick (oncoming nurse) by Ingrid Siddiqui RN (offgoing nurse). Report included the following information SBAR, Kardex, OR Summary, Intake/Output and MAR.    0800  Shift assessment completed. Pt AOX4 RA. Lung sounds clear, bowel sounds active. Pt has midline incision that is enforced with staples and mepilex foam dressing as well as trocar sites x3 that are enforced with band-aids that are CDI. Pt has 18G Left Forearm and 20G Right Forearm CDI. Pt has pete catheter that is patent but pt has minimal output originating overnight. Pt received Lactated Ringers (500ml) bolus overnight. PRN Toradol held this AM d/t creatinine of 1.59. Pt educated on the importance of ambulation as tolerated and states she will ambulate more with assistance. [address] with Jerris Gitelman, NP. Will continue to monitor VS and urine output. If pete is draining well, pete to d/c after lunch. Pt to continue with PRN Percocet for pain management. 1040  Pt received Lactated Ringers (500ml) bolus. 1330  Pete output 300ml of pedrito-colored. Notified and spoke with Jerris Gitelman, NP and states that pete catheter will remain in place and that labs will be continued to monitored overnight. Ultrasound retroperitoneum ordered. Pt received PRN Percocet (1 tab) for 5/10 pain in the abdomen. Pt also utilizing ice pack. Pt states she wants to allow PRN pain medication to take effect and then she will ambulate in hallway with assistance. 1520  Pt ambulated lap x1 in hallway with assistance. Pt tolerated well overall but experienced slight dyspnea on exertion. 1545  Pt left the floor for ultrasound. 80  Pt returned to floor from ultrasound. Pt voided 500ml pedrito-colored urine. Pt preparing to eat lunch. 1845  Uneventful shift. Pt pain management improving with decreased requests for PRN pain medication and increased use of ice packs.  Urine output increasing and pt expressing desire for ambulation. Pt voided 350 ml, pedrito-colored urine. Pt received PRN Percocet (1 tab) for 5/10 pain in the abdomen. Pt is also utilizing ice pack. Pt eating dinner with  at the bedside. Pt encouraged to ambulate overnight as tolerated. Pt verbalizes understanding. 1935  Bedside and Verbal shift change report given by GARRETT Taylor RN (off going nurse) to SHANI Anaya RN (on coming nurse). Report included the following information SBAR, Kardex, OR Summary, Intake/Output and MAR.

## 2020-11-06 NOTE — PROGRESS NOTES
Patient BP 80/47 with HR 65 with manual 95/55. Pete cath intact, tubing without kinks, 400 ml pedrito, clear urine within 7 hrs. IVF intake within 7 hrs 1197.59 ml. Bladder scanned 110 ml. No signs of vaginal bleeding. Patient Vitals for the past 4 hrs:   Temp Pulse Resp BP SpO2   11/06/20 0304 97.9 °F (36.6 °C) 65 16 (!) 95/55 98 %   11/06/20 0303  65  (!) 80/47        Dr. Reilly Trinidad notified and LR bolus of 500 ml started and H&H drawn. Will monitor. 0410 - Bolus complete. BP improved. BP (!) 108/53 (BP 1 Location: Left arm, BP Patient Position: At rest)   Pulse 72   Temp 97.9 °F (36.6 °C)   Resp 16   Ht 5' 5\" (1.651 m)   Wt 137.6 kg (303 lb 6 oz)   LMP 07/19/2011   SpO2 98%   BMI 50.48 kg/m²  H&H 11.5 & 35.4.    0634 - Additional 100 ml of clear, pedrito urine out. Total of 200 ml out since 0000 with IVF intake of 1165.67 ml. Pete catheter intact and draining. Will leave pete cather in place until Dr. Reilly Trinidad or Nurse Practioner here to see patient. 0700 - Bedside and Verbal shift change report given to GARRETT Santiago RN (oncoming nurse) by Jamie Pathak (offgoing nurse). Report included the following information SBAR, Kardex, Intake/Output and MAR.     5504 - Assisted patient to sit up at bedside. Patient stated, \"a little lightheaded\". Assisted patient to stand and attempted to ambulate. Patient denied further dizziness. Ambulated to back elevators and back to room. CHG wipes completed with bed and gown change. Updated GARRETT Santiago RN.

## 2020-11-06 NOTE — PROGRESS NOTES
DC Plan: Home    Chart reviewed. Pt admitted in obs status to [facility] by MD Zoë Medellin for hysterectomy. CM will follow for transition of care needs [phone]      Transition of Care (ROSSANA) Plan:          Pt admitted for an elective surgical procedure. Pt is independent. Please encourage ambulation. No transition of care needs identified at this time. Anticipate pt will be medically stable for discharge within the next 24-48 hours with physician follow up. CM available to assist as needed. ROSSANA Transportation:   How is patient being transported at discharge? Family/Friend      When? Once cleared by physician     Is transport scheduled? N/A      Follow-up appointment and transportation:   PCP/Specialist?  See AVS for Appointment         Who is transporting to the follow-up appointment? Self/Family/Friend      Is transport for follow up appointment scheduled? N/A    Communication plan (with patient/family): Who is being called? Patient or Next of Kin? Responsible party? Patient      What number(s) is to be used? See Facesheet      What service provider is calling for [facility] services? When are they calling? Readmission Risk?   (Green/Low; Yellow/Moderate; Red/High):  Green      Care Management Interventions  Transition of Care Consult (CM Consult): Discharge Planning  Discharge Location  Discharge Placement: Home

## 2020-11-06 NOTE — PROGRESS NOTES
Admit date: 11/5/2020        ASSESSMENT/PLAN  Tuan Ibanez is a 62 y. o.female POD#1 s/p Laparoscopic lysis of adhesions followed by robotic-assisted total laparoscopic hysterectomy, right salpingo-oophorectomy, staging, washings. Onc - grade I-II endometrial cancer, final path pending  GI - no n/v  RONY - [email or web address], lytes stable, regular diet  ID - afebrile, WBC nl  Renal - decreased UOP overnight, creatinine 1.59, same on repeat. Renal US to further evaluate. Will continue IVF and maintain pete for accurate UOP  Heme - hgb 11.5, will monitor  DVT Prophylaxis - Lovenox, SCDs  CV - intermittent hypotension  Pulm - encourage IS  Disp - continue inpatient management      SUBJECTIVE  Pain well managed, no VB. Feels a little dazed. No n/v. Has not passed flatus yet.  Has been OOB      OBJECTIVE  Physical Exam:  Patient Vitals for the past 24 hrs:   BP Temp Pulse Resp SpO2 Weight   11/06/20 1125 (!) 110/49 98.7 °F (37.1 °C) 76 15 94 %    11/06/20 0715 113/61 98.2 °F (36.8 °C) 74 17 96 %    11/06/20 0408 (!) 108/53  72      11/06/20 0304 (!) 95/55 97.9 °F (36.6 °C) 65 16 98 %    11/06/20 0303 (!) 80/47  65      11/05/20 2214 (!) 96/52 98.1 °F (36.7 °C) 63 16 98 % 137.6 kg (303 lb 6 oz)   11/05/20 1959 105/76 98 °F (36.7 °C) 64 16 100 %    11/05/20 1650 102/77 97.4 °F (36.3 °C) 66 16 99 %    11/05/20 1637   70 15 97 %    11/05/20 1635 112/75  69 14 96 %    11/05/20 1630 119/79  68 17 97 %    11/05/20 1625 101/69  68 14 97 %    11/05/20 1620 114/73  68 12 96 %    11/05/20 1615 121/66    95 %    11/05/20 1614   70 11 96 %    11/05/20 1611    12     11/05/20 1610 125/73  72 11 96 %    11/05/20 1606  97.4 °F (36.3 °C)       11/05/20 1605 106/76  74 17 95 %    11/05/20 1600 114/72  71 12 95 %    11/05/20 1555 112/74  69 12 100 %    11/05/20 1554    13     11/05/20 1550 120/69  71 15 100 %    11/05/20 1545 120/75  71 14 99 %    11/05/20 1541   71 17 100 %  11/05/20 1540 109/67  73 17 99 %    11/05/20 1536 (!) 128/56 98 °F (36.7 °C) 74 10 99 %          Intake/Output Summary (Last 24 hours) at 11/6/2020 1304  Last data filed at 11/6/2020 1250  Gross per 24 hour   Intake 5250.09 ml   Output 950 ml   Net 4300.09 ml        General - resting in no acute distress, alert and oriented  HEENT - within normal limits  Heart - regular rate and rhythm  Lungs - clear to auscultation  Abdomen - soft, nontender, nondistended, hypoactive bowel sounds  Incision - clean, dry, intact  Extremities - no edema    Labs  CBC  Recent Labs     11/06/20  0308   WBC 8.1   HCT 35.4   MCV 95.2   MONOS 7   EOS 0   BASOS 0        CMP  Recent Labs     11/06/20  1218 11/06/20  0308    138   CO2 28 30   BUN 19* 18        Lab Results   Component Value Date/Time    Glucose 125 (H) 11/06/2020 12:18 PM    Glucose (POC) 103 11/05/2020 11:27 AM          Current Hospital Medications    Current Facility-Administered Medications:     lactated Ringers infusion, 125 mL/hr, IntraVENous, CONTINUOUS, Geetha Grey MD, Last Rate: 125 mL/hr at 11/06/20 0216, 125 mL/hr at 11/06/20 0216    lisinopriL (PRINIVIL, ZESTRIL) tablet 10 mg, 10 mg, Oral, BID, Geetha Grey MD    sodium chloride (NS) flush 5-40 mL, 5-40 mL, IntraVENous, Q8H, Geetha Grey MD, 10 mL at 11/05/20 1700    sodium chloride (NS) flush 5-40 mL, 5-40 mL, IntraVENous, PRN, Geetha Grey MD    ketorolac (TORADOL) injection 15 mg, 15 mg, IntraVENous, Q6H PRN, Geetha Grye MD, 15 mg at 11/06/20 0008    oxyCODONE-acetaminophen (PERCOCET) 5-325 mg per tablet 1 Tab, 1 Tab, Oral, Q4H PRN, Geetha Grey MD    oxyCODONE-acetaminophen (PERCOCET) 5-325 mg per tablet 2 Tab, 2 Tab, Oral, Q4H PRN, Geetha Grey MD, 2 Tab at 11/06/20 0912    naloxone [facility]) injection 0.4 mg, 0.4 mg, IntraVENous, PRN, Geetha Grey MD    ondansetron [facility]) injection 4 mg, 4 mg, IntraVENous, Q4H PRN, Yuriy Hess MD    diphenhydrAMINE (BENADRYL) capsule 25 mg, 25 mg, Oral, Q4H PRN, Yuriy eHss MD, 25 mg at 11/05/20 2054    benzocaine-menthoL (CEPACOL) lozenge 1 Lozenge, 1 Lozenge, Oral, PRN, Yuriy Hess MD    enoxaparin (LOVENOX) injection 40 mg, 40 mg, SubCUTAneous, Q24H, Yuriy Hess MD, 40 mg at 11/06/20 0913      [facility]  Pager  [phone]

## 2020-11-06 NOTE — PROGRESS NOTES
1925: Bedside report received from Nathaly Lerma RN. Assumed care of pt at this time. Pt in bed with no signs of distress. Pt left with call light within reach and encouraged to call for assistance. 2054: Head to toe assessment performed at this time. Pt denies any chest pain or SOB. Pt denies any numbness or tingling to extremities. Pt encouraged to manage pain and to use the incentive spirometer. Pt state pain as 6/10. Pt received medication as per MAR. Potential medication side effects explained to patient, patient verbalizes understanding, opportunities for questions provided. Patient stable, no apparent distress at this time, bed in locked position, call bell within reach. 2310:   Bedside shift change report given to Radha Blas RN (oncoming nurse) by Kavin ALFARO RN. Report included the following information SBAR, Kardex and MAR.

## 2020-11-06 NOTE — OP NOTES
[facility]  OPERATIVE REPORT    Name:  Sukhdev Vogt  MR#:   [id number]  :  1965  ACCOUNT #:  [de-identified]  DATE OF SERVICE:  2020    PREOPERATIVE DIAGNOSIS:  Grade I-II endometrial cancer. POSTOPERATIVE DIAGNOSIS:  Grade I-II endometrial cancer. PROCEDURE PERFORMED:  Laparoscopic lysis of adhesions followed by robotic-assisted total laparoscopic hysterectomy, right salpingo-oophorectomy, staging, washings. SURGEON:  Manuel Rodriguez DO    ASSISTANT:  Italo Doan    ASSISTANT TASK:  Retraction, uterine manipulation, closing. ANESTHESIA:  General.    FLUIDS:  1250. URINE OUTPUT:  200 clear urine. COMPLICATIONS:  None. SPECIMENS REMOVED:  Uterus, cervix, right tube and ovary, right pelvic sentinel lymph node, left pelvic sentinel lymph node, washings. IMPLANTS:  None. ESTIMATED BLOOD LOSS:  150. FINDINGS:  Laparoscopic findings revealed omental adhesions to the anterior abdominal wall. Within the pelvis, she had a 16-week size uterus with normal right tube and ovary, left adnexa surgically absent. INDICATIONS:  The patient is a 24-year-old who initially developed postmenopausal bleeding in 2020 or 2020 and was set to see a gynecologist but with COVID she was unable. She was then seen at the [facility] and had endometrial biopsy consistent with a grade I-II endometrioid adenocarcinoma. She presents today for definitive surgical management. PROCEDURE:  The patient taken to operating room where general anesthesia was obtained without difficulty. She was placed in the dorsal lithotomy position, prepped and draped in normal sterile fashion. Surgical time-out was taken by the entire surgical team.  A Valentino catheter was placed. A sterile speculum was then placed in the patient's vagina. Anterior lip of the cervix was grasped with single-tooth tenaculum.   The cervix was then dilated, and a 12-cm RASHEEDA tip with a 3.0-cm Koh cup was advanced without difficulty. Attention was then turned to the abdomen where a Veress needle was introduced in the umbilicus. Pneumoperitoneum was obtained to 15 mmHg. An incision was then made approximately 2-3 cm above the umbilicus and an 8-mm robotic trocar was advanced. Laparoscope was introduced. There was no evidence of injury from our entry with the findings above. At this point, an 8-mm robotic trocar was placed in the left and right side of the abdomen under direct visualization and using Endo Manuel, the omental adhesions were taken down. Once this was done, the patient was placed in Trendelenburg and a 5-mm trocar was placed in right upper quadrant under direct visualization. Washings were then obtained. The robot was undocked at the console. Attention was turned to the right side. The right round ligament was divided using monopolar cautery. This peritoneal incision was extended superiorly along the right IP ligament. The right retroperitoneal space was opened. Firefly was activated. There was no obvious mapping. Attention was then turned to the left side where the left round ligament was divided using monopolar cautery. This peritoneal incision was extended superiorly along the remnant of the left IP ligament. The left retroperitoneal space was opened. Firefly was activated and there was a node identified at the left mid external iliac artery. This was carefully dissected free from its attachments to the artery and psoas muscle and placed in Endo Catch bag. Attention was turned back to the right side where there was some uptake along the right external iliac nodes. the nodes were carefully dissected free from their attachment to external iliac artery and vein, placed in Endo Catch bag, removed and sent for permanent specimen. At this point, the right IP ligament was skeletonized, sealed, and divided. The adnexa was elevated.   The posteromedial leaf of the broad ligament was incised towards the Rashida cup. Next, the uterus was retroverted. There was some adhesions of the bladder on the left side which were taken down using careful sharp and blunt dissection and then the anterior vesicouterine peritoneum was identified and incised along the lower uterine segment. The bladder flap was created using both blunt and sharp dissection. The uterine arteries were then skeletonized bilaterally, sealed, and divided. The cardinal ligaments were sealed and divided. An anterior colpotomy was made. This was carried circumferentially around the [facility] cup. The specimen was placed in the upper abdomen. The vaginal cuff was then closed with 2-0 PDS in a running fashion. The pelvis was irrigated copiously. Tanner powder was placed over the lymphatic resection beds and the vaginal cuff for better hemostasis. At this point, all instruments were removed and the robot was undocked and trocars were removed. Given the large size of uterus, our supraumbilical incision was extended proximally 4 cm and this was carried down to underlying fascia. The fascia was then incised superior and inferiorly. The peritoneum was entered sharply. The specimen was pulled through the incision and sent for frozen section which was difficult secondary to adenomyosis and fibroids; therefore, they could not assess an accurate depth of invasion. At this point,  the fascia was reapproximated with a #1 PDS in a running fashion and the subcutaneous tissue was irrigated and reapproximated with 3-0 Vicryl x2. The skin was then closed  with staples. Exparel was injected at all sites. The patient tolerated the procedure well. Sponge, needle, and instrument counts were correct x2. The patient was taken to recovery room in stable condition.       Flavia Smiley DO [id number]  D:  11/05/2020 15:26  T:  11/06/2020 1:53  JOB #:  [id number]

## 2020-11-06 NOTE — PROGRESS NOTES
Problem: Falls - Risk of  Goal: *Absence of Falls  Description: Document Star Bravo Fall Risk and appropriate interventions in the flowsheet.   Outcome: Progressing Towards Goal  Note: Fall Risk Interventions:  Mobility Interventions: Assess mobility with egress test, Patient to call before getting OOB         Medication Interventions: Teach patient to arise slowly, Patient to call before getting OOB    Elimination Interventions: Call light in reach, Patient to call for help with toileting needs y/o male admitted with possible aspiration PNA, h/o aspiration PNA recently seen for clinical swallow eval. RN reports pt was choking after breakfast this morning. Pt with audible wet breath sounds at baseline requiring suctioning prior to PO trials. Pt with oral-pharyngeal dyspahgia with reduced oral grading, oral holding, suspected premature loss of bolus and swallow delay, laryngeal excursion reduced. Pt is high risk of aspiration with any PO consistency. 74 y/o male admitted with possible aspiration PNA, h/o aspiration PNA recently seen for clinical swallow eval. Pt very confused and unable to provide any information. 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   Pt with oral-pharyngeal dysphagia.  Pt is high risk of aspiration, SLP rec puree diet w/HT liquids 1:1 assit with meals. Pt admitted with stage I R heel.  NKA to food. BMI wnl. See PMH below:

## 2020-11-07 VITALS
BODY MASS INDEX: 48.82 KG/M2 | WEIGHT: 293 LBS | OXYGEN SATURATION: 96 % | TEMPERATURE: 98.2 F | RESPIRATION RATE: 16 BRPM | SYSTOLIC BLOOD PRESSURE: 97 MMHG | HEIGHT: 65 IN | DIASTOLIC BLOOD PRESSURE: 70 MMHG | HEART RATE: 83 BPM

## 2020-11-07 LAB
ALBUMIN SERPL-MCNC: 2.7 G/DL (ref 3.4–5)
ALBUMIN/GLOB SERPL: 0.8 {RATIO} (ref 0.8–1.7)
ALP SERPL-CCNC: 52 U/L (ref 45–117)
ALT SERPL-CCNC: 18 U/L (ref 13–56)
ANION GAP SERPL CALC-SCNC: 4 MMOL/L (ref 3–18)
AST SERPL-CCNC: 18 U/L (ref 10–38)
BASOPHILS # BLD: 0 K/UL (ref 0–0.1)
BASOPHILS NFR BLD: 0 % (ref 0–2)
BILIRUB SERPL-MCNC: 0.4 MG/DL (ref 0.2–1)
BUN SERPL-MCNC: 12 MG/DL (ref 7–18)
BUN/CREAT SERPL: 12 (ref 12–20)
CALCIUM SERPL-MCNC: 8.1 MG/DL (ref 8.5–10.1)
CHLORIDE SERPL-SCNC: 107 MMOL/L (ref 100–111)
CO2 SERPL-SCNC: 29 MMOL/L (ref 21–32)
CREAT SERPL-MCNC: 0.99 MG/DL (ref 0.6–1.3)
DIFFERENTIAL METHOD BLD: ABNORMAL
EOSINOPHIL # BLD: 0.1 K/UL (ref 0–0.4)
EOSINOPHIL NFR BLD: 1 % (ref 0–5)
ERYTHROCYTE [DISTWIDTH] IN BLOOD BY AUTOMATED COUNT: 15.2 % (ref 11.6–14.5)
GLOBULIN SER CALC-MCNC: 3.2 G/DL (ref 2–4)
GLUCOSE SERPL-MCNC: 91 MG/DL (ref 74–99)
HCT VFR BLD AUTO: 34.7 % (ref 35–45)
HGB BLD-MCNC: 11.4 G/DL (ref 12–16)
LYMPHOCYTES # BLD: 2.2 K/UL (ref 0.9–3.6)
LYMPHOCYTES NFR BLD: 29 % (ref 21–52)
MCH RBC QN AUTO: 31.3 PG (ref 24–34)
MCHC RBC AUTO-ENTMCNC: 32.9 G/DL (ref 31–37)
MCV RBC AUTO: 95.3 FL (ref 74–97)
MONOCYTES # BLD: 0.6 K/UL (ref 0.05–1.2)
MONOCYTES NFR BLD: 8 % (ref 3–10)
NEUTS SEG # BLD: 4.7 K/UL (ref 1.8–8)
NEUTS SEG NFR BLD: 62 % (ref 40–73)
PLATELET # BLD AUTO: 272 K/UL (ref 135–420)
PMV BLD AUTO: 9.5 FL (ref 9.2–11.8)
POTASSIUM SERPL-SCNC: 4.2 MMOL/L (ref 3.5–5.5)
PROT SERPL-MCNC: 5.9 G/DL (ref 6.4–8.2)
RBC # BLD AUTO: 3.64 M/UL (ref 4.2–5.3)
SODIUM SERPL-SCNC: 140 MMOL/L (ref 136–145)
WBC # BLD AUTO: 7.6 K/UL (ref 4.6–13.2)

## 2020-11-07 PROCEDURE — 85025 COMPLETE CBC W/AUTO DIFF WBC: CPT

## 2020-11-07 PROCEDURE — 36415 COLL VENOUS BLD VENIPUNCTURE: CPT

## 2020-11-07 PROCEDURE — 74011250637 HC RX REV CODE- 250/637: Performed by: NURSE PRACTITIONER

## 2020-11-07 PROCEDURE — 80053 COMPREHEN METABOLIC PANEL: CPT

## 2020-11-07 PROCEDURE — 96372 THER/PROPH/DIAG INJ SC/IM: CPT

## 2020-11-07 PROCEDURE — 99218 HC RM OBSERVATION: CPT

## 2020-11-07 PROCEDURE — 74011250637 HC RX REV CODE- 250/637: Performed by: OBSTETRICS & GYNECOLOGY

## 2020-11-07 PROCEDURE — 74011250636 HC RX REV CODE- 250/636: Performed by: OBSTETRICS & GYNECOLOGY

## 2020-11-07 RX ORDER — OXYCODONE AND ACETAMINOPHEN 5; 325 MG/1; MG/1
1-2 TABLET ORAL
Qty: 40 TAB | Refills: 0 | Status: SHIPPED | OUTPATIENT
Start: 2020-11-07 | End: 2020-11-21

## 2020-11-07 RX ORDER — DOCUSATE SODIUM 100 MG/1
100 CAPSULE, LIQUID FILLED ORAL 2 TIMES DAILY
Status: DISCONTINUED | OUTPATIENT
Start: 2020-11-07 | End: 2020-11-07 | Stop reason: HOSPADM

## 2020-11-07 RX ORDER — POLYETHYLENE GLYCOL 3350 17 G/17G
17 POWDER, FOR SOLUTION ORAL DAILY
Status: DISCONTINUED | OUTPATIENT
Start: 2020-11-07 | End: 2020-11-07 | Stop reason: HOSPADM

## 2020-11-07 RX ORDER — OXYCODONE AND ACETAMINOPHEN 5; 325 MG/1; MG/1
1-2 TABLET ORAL
Qty: 40 TAB | Refills: 0 | Status: SHIPPED | OUTPATIENT
Start: 2020-11-07 | End: 2020-11-07

## 2020-11-07 RX ADMIN — DOCUSATE SODIUM 100 MG: 100 CAPSULE, LIQUID FILLED ORAL at 09:12

## 2020-11-07 RX ADMIN — POLYETHYLENE GLYCOL 3350 17 G: 17 POWDER, FOR SOLUTION ORAL at 09:12

## 2020-11-07 RX ADMIN — OXYCODONE HYDROCHLORIDE AND ACETAMINOPHEN 2 TABLET: 5; 325 TABLET ORAL at 04:51

## 2020-11-07 RX ADMIN — OXYCODONE HYDROCHLORIDE AND ACETAMINOPHEN 2 TABLET: 5; 325 TABLET ORAL at 09:26

## 2020-11-07 RX ADMIN — SODIUM CHLORIDE, SODIUM LACTATE, POTASSIUM CHLORIDE, AND CALCIUM CHLORIDE 125 ML/HR: 600; 310; 30; 20 INJECTION, SOLUTION INTRAVENOUS at 04:54

## 2020-11-07 RX ADMIN — ENOXAPARIN SODIUM 40 MG: 40 INJECTION SUBCUTANEOUS at 09:13

## 2020-11-07 RX ADMIN — Medication 10 ML: at 07:20

## 2020-11-07 NOTE — PROGRESS NOTES
0715  Bedside and Verbal shift change report given to GARRETT Mireles RN (oncoming nurse) by SHANI Jackson RN (offgoing nurse). Report included the following information SBAR, Kardex, OR Summary, Intake/Output and MAR.    0800  Shift assessment completed. Pt AOX4 RA. Lung sounds clear, bowel sounds active. Pt has midline incision that is enforced with staples and mepilex foam dressing which has scant drainage. Pt also has band-aids to trocar sites x3. Skin otherwise intact. Valentino removed overnight and pt has been voiding well. VS and lab results stable. Pain has been well managed with PRN Percocet and ice pack. Education reinforced regarding incentive spirometer use. [place] goal is 2000ml. Pt also has 20G Left Forearm and 18G Right Forearm that are CDI. Spoke with Kemi Mendez NP and was informed that pt is to void prior to discharge today. Pt informed and educated that it is important to void prior to discharge. Pt verbalizes understanding. 1200  Pt voided 200ml, yellow-straw colored. Pt tolerated breakfast and lunch well consuming 100% of both. 1400  Pt voided an additional 200ml, yellow-straw colored. Pt received discharge instructions from GARRETT Redman and was provided the opportunity to address any questions or concerns. Pt verbalized understanding. 1420  Pt discharged via transport with belongings. 20G Left Forearm and 18G Right Forearm removed.

## 2020-11-07 NOTE — DISCHARGE SUMMARY
GYN Discharge Summary                        Patient ID:  Chelo Clifford  62 y.o. Admission Date: 11/5/2020  Discharge Date:  11/07/20                                                 Attending: Shira Miller MD   PCP: Ashkan Lam MD                                                                                               Reason for admission:Grade I-II endometrial cancer.     Discharge  diagnosis: Active Problems:    Endometrial cancer ([facility] [address] (11/5/2020)        Associated Conditions:        Patient Active Problem List   Diagnosis Code    Obesity E66.9    Hemorrhoid K64.9    Colon polyps K63.5    HTN (hypertension) I10    DUB (dysfunctional uterine bleeding) N93.8    Fibroid, uterine D25.9    S/P appendectomy Z90.49    Obesity, morbid ([facility] [address] E66.01    Endometrial cancer ([facility] C54.1              Past Medical History:   Diagnosis Date    Abdominal pain     LLQ  >> GI    Asthma     asthma in childhood    Elevated blood pressure     Hypertension     Obesity     Spasm of lung air passages 2010       Operative Procedure: Laparoscopic lysis of adhesions followed by robotic-assisted total laparoscopic hysterectomy, right salpingo-oophorectomy, staging, washings.     Complications:  none                   Transfusion:  none    Hospital Course: elevated creatinine and decreased UOP POD#1, resolved    Condition at discharge: good, stable, Afebrile, Ambulating and Eating, Drinking, Voiding    Labs:  Lab Results   Component Value Date/Time    WBC 7.6 11/07/2020 01:22 AM    HGB 11.4 (L) 11/07/2020 01:22 AM    HCT 34.7 (L) 11/07/2020 01:22 AM    PLATELET 185 31/33/9733 01:22 AM    MCV 95.3 11/07/2020 01:22 AM     Lab Results   Component Value Date/Time    Sodium 140 11/07/2020 01:22 AM    Potassium 4.2 11/07/2020 01:22 AM    Chloride 107 11/07/2020 01:22 AM    CO2 29 11/07/2020 01:22 AM    Anion gap 4 11/07/2020 01:22 AM    Glucose 91 11/07/2020 01:22 AM    BUN 12 11/07/2020 01:22 AM    Creatinine 0.99 11/07/2020 01:22 AM    BUN/Creatinine ratio 12 11/07/2020 01:22 AM    GFR est AA >60 11/07/2020 01:22 AM    GFR est non-AA 58 (L) 11/07/2020 01:22 AM         Current Discharge Medication List      CONTINUE these medications which have CHANGED    Details   oxyCODONE-acetaminophen (PERCOCET) 5-325 mg per tablet Take 1-2 Tabs by mouth every four (4) hours as needed for Pain for up to 14 days. Max Daily Amount: 12 Tabs. Indications: pain  Qty: 40 Tab, Refills: 0    Associated Diagnoses: Postoperative pain         CONTINUE these medications which have NOT CHANGED    Details   lisinopril (PRINIVIL, ZESTRIL) 10 mg tablet Take 1 Tab by mouth two (2) times a day.  Qty: 60 Tab, Refills: 3    Associated Diagnoses: HTN (hypertension)      acetaminophen/diphenhydramine (TYLENOL PM PO) Take  by mouth.      aspirin (ASPIRIN) 325 mg tablet Take 325 mg by mouth daily.      POTASSIUM CHLORIDE by Does Not Apply route.      Uhqciueu-Axhw-Dccjwc-Hyalur Ac 256-025-71-2 mg cap Take  by mouth.      Calcium-Cholecalciferol, D3, (Calcium 600 with Vitamin D3) 600 mg(1,500mg) -400 unit chew Take  by mouth.               Patient Instructions:        Disposition:  Home    Follow-up:  Follow up with Dr. Mak in 2 weeks    Author:   Aaliyah DAVIS-BC  Gynecologic Oncology  11/7/2020  6:44 AM

## 2020-11-07 NOTE — DISCHARGE INSTRUCTIONS
Patient Education        Laparoscopic Hysterectomy: What to Expect at Home  Your Recovery     A laparoscopic hysterectomy is surgery to take out the uterus. Your doctor put a lighted tube and surgical tools through small cuts in your belly to remove the uterus. You can expect to feel better and stronger each day. But you might need pain medicine for a week or two. You may get tired easily or have less energy than usual. The tiredness may last for several weeks after surgery. You will probably notice that your belly is swollen and puffy. This is common. The swelling will take several weeks to go down. You may take about 4 to 6 weeks to fully recover. It's important to avoid lifting while you are recovering so that you can heal.  This care sheet gives you a general idea about how long it will take for you to recover. But each person recovers at a different pace. Follow the steps below to get better as quickly as possible. How can you care for yourself at home? Activity    · Rest when you feel tired.     · Be active. Walking is a good choice.     · Allow the area to heal. Don't move quickly or lift anything heavy until you are feeling better.     · You may shower 24 to 48 hours after surgery, if your doctor okays it. Pat the incision dry. Do not take a bath for the first 2 weeks, or until your doctor tells you it is okay.     · Ask your doctor when it is okay for you to have sex. Diet    · You can eat your normal diet. If your stomach is upset, try bland, low-fat foods like plain rice, broiled chicken, toast, and yogurt.     · If your bowel movements are not regular right after surgery, try to avoid constipation and straining. Drink plenty of water. Your doctor may suggest fiber, a stool softener, or a mild laxative. Medicines    · Your doctor will tell you if and when you can restart your medicines.  He or she will also give you instructions about taking any new medicines.     · If you take aspirin or some other blood thinner, ask your doctor if and when to start taking it again. Make sure that you understand exactly what your doctor wants you to do.     · Be safe with medicines. Read and follow all instructions on the label. ? If the doctor gave you a prescription medicine for pain, take it as prescribed. ? If you are not taking a prescription pain medicine, ask your doctor if you can take an over-the-counter medicine.     · If your doctor prescribed antibiotics, take them as directed. Do not stop taking them just because you feel better. You need to take the full course of antibiotics. Incision care    · You may have stitches over the cuts (incisions) the doctor made in your belly.     · If you have strips of tape on the cut (incision) the doctor made, leave the tape on for a week or until it falls off.     · Wash the area daily with warm, soapy water, and pat it dry. Don't use hydrogen peroxide or alcohol. They can slow healing.     · You may cover the area with a gauze bandage if it oozes fluid or rubs against clothing.     · Change the bandage every day. Other instructions    · You may have some light vaginal bleeding. Wear sanitary pads if needed. Do not douche or use tampons.     · Don't have sex until the doctor says it is okay. Follow-up care is a key part of your treatment and safety. Be sure to make and go to all appointments, and call your doctor if you are having problems. It's also a good idea to know your test results and keep a list of the medicines you take. When should you call for help? Call 911 anytime you think you may need emergency care. For example, call if:    · You passed out (lost consciousness).     · You have chest pain, are short of breath, or cough up blood.    Call your doctor now or seek immediate medical care if:    · You have pain that does not get better after you take pain medicine.     · You cannot pass stools or gas.     · You have vaginal discharge that has increased in amount or smells bad.     · You are sick to your stomach or cannot drink fluids.     · You have loose stitches, or your incision comes open.     · Bright red blood has soaked through the bandage over your incision.     · You have signs of infection, such as:  ? Increased pain, swelling, warmth, or redness. ? Red streaks leading from the incision. ? Pus draining from the incision. ? A fever.     · You have bright red vaginal bleeding that soaks one or more pads in an hour, or you have large clots.     · You have signs of a blood clot in your leg (called a deep vein thrombosis), such as:  ? Pain in your calf, back of the knee, thigh, or groin. ? Redness and swelling in your leg or groin. Watch closely for changes in your health, and be sure to contact your doctor if you have any problems. Where can you learn more? Go to http://www.gray.com/  Enter Q131 in the search box to learn more about \"Laparoscopic Hysterectomy: What to Expect at Home. \"  Current as of: November 8, 2019               Content Version: 12.6  © 5455-4494 [employer]. Care instructions adapted under license by [employer] (which disclaims liability or warranty for this information). If you have questions about a medical condition or this instruction, always ask your healthcare professional. [address] any warranty or liability for your use of this information.

## 2020-11-07 NOTE — PROGRESS NOTES
1935 - Bedside and Verbal shift change report given to Madan Waldron RN  (oncoming nurse) by Lilly Oden. Elizabeth Pham RN (offgoing nurse). Report included the following information SBAR, Kardex, Intake/Output, MAR and Recent Results. 2043 - Assessment complete. Denies dyspnea, chest pain, numbness and tingling. Lower left leg visibly bigger and harder on palpation. Pt states this is due to ingrown hair from three years ago, her PCP did a work up for DVT which was clear. Skin feels warm touch and skin color ethnically appropriate. Medicated with 2 tabs of Percocet for pain level 7/10.    2110 - Orders for AM labs. Do not remove pete before provider rounds on patient in AM. NP also made aware of last BP and last dose of lisinopril being held, states this is okay and to continue to monitor. 3200 - Reassessment complete. No changes. Medicated with 2 tabs of Percocet for pain level 7/10.    0623 - DOMINIC Anderson NP called for updated. No acute changes overnight. Labs improved. Order received to remove pete. States patient may d/c today. Will be on unit to assess patient. 0730 - Bedside and Verbal shift change report given to GARRETT Anguiano (oncoming nurse) by Madan Waldron RN (offgoing nurse). Report included the following information SBAR, Kardex, Intake/Output, MAR and Recent Results.

## 2020-11-07 NOTE — PROGRESS NOTES
Problem: Falls - Risk of  Goal: *Absence of Falls  Description: Document Brad Juan Carlos Fall Risk and appropriate interventions in the flowsheet.   Outcome: Progressing Towards Goal  Note: Fall Risk Interventions:  Mobility Interventions: Assess mobility with egress test, Patient to call before getting OOB         Medication Interventions: Patient to call before getting OOB, Teach patient to arise slowly    Elimination Interventions: Call light in reach, Patient to call for help with toileting needs

## 2020-11-11 NOTE — PERIOP NOTES
Advised Montierre(RN) to review a SBAR. Instructions: This plan will send the code FBSE to the PM system.  DO NOT or CHANGE the price. Price (Do Not Change): 0.00 Detail Level: Simple

## 2020-11-18 NOTE — PROGRESS NOTES
[facility] [facility]  [address] [place] [address], [address], [facility]  [address] [facility], [address] [place] [address]  [place], [address]   [phone]  Anika [place] DO      Postoperative Office Note  Patient ID:  Name: Dixon Franco  MRM: [id number]  : 1965/54 y.o. Date: 2020    SUBJECTIVE:    This is a 62 y.o.  female who presents s/p Laparoscopic lysis of adhesions followed by robotic-assisted total laparoscopic hysterectomy, right salpingo-oophorectomy, staging, washings. on 2020  Currently she has no problems with eating, bowel movements, voiding, or their wound  Appetite is good. Eating a regular diet without difficulty. Urinating without difficulty. Bowel movements are regular. The patient is not having any pain. .    Her pathology revealed      A: UTERUS, RIGHT TUBE AND OVARY, RESECTION:   ENDOMETRIAL CARCINOMA. HISTOLOGIC TYPE: ENDOMETRIOID   HISTOLOGIC GRADE: FIGO I.   MYOMETRIAL INVASION: NOT IDENTIFIED (SEE MICROSCOPIC DESCRIPTION). UTERINE SEROSAL INVOLVEMENT: NOT IDENTIFIED. CERVICAL STROMAL INVOLVEMENT: NOT IDENTIFIED. OTHER ORGAN INVOLVEMENT: NOT IDENTIFIED. LYMPHOVASCULAR SPACE INVASION: NOT IDENTIFIED. ESTROGEN RECEPTOR: PENDING. PROGESTERONE RECEPTOR: PENDING. MISMATCH REPAIR: PENDING. OTHER FINDINGS:   CERVIX: MILD CHRONIC AND ACUTE INFLAMMATION. MYOMETRIUM: LEIOMYOMATA. ADENOMYOSIS. UTERINE TUBE: NOT IDENTIFIED. OVARY: CORTICAL ADHESIONS AND CORTICAL SEROUS EPITHELIAL   CYSTS. AJCC STAGE (8TH EDITION): pT1a N0.   B: LYMPH NODE, PELVIS, LEFT ASPECT, SENTINEL, BIOPSY:   ONE LYMPH NODE WITH NO METASTATIC CARCINOMA (0/1). C: LYMPH NODE, PELVIS, RIGHT ASPECT, SENTINEL, BIOPSY:   ADIPOSE CONNECTIVE TISSUE WITH NO METASTATIC CARCINOMA.        Medications:     Current Outpatient Medications on File Prior to Visit   Medication Sig Dispense Refill    oxyCODONE-acetaminophen (PERCOCET) 5-325 mg per tablet Take 1-2 Tabs by mouth every four (4) hours as needed for Pain for up to 14 days. Max Daily Amount: 12 Tabs. Indications: pain 40 Tab 0    acetaminophen/diphenhydramine (TYLENOL PM PO) Take  by mouth.  aspirin (ASPIRIN) 325 mg tablet Take 325 mg by mouth daily.  POTASSIUM CHLORIDE by Does Not Apply route.  Wvzlrkqz-Jria-Zsyyzz-Hyalur Ac 254-732-04-2 mg cap Take  by mouth.  Calcium-Cholecalciferol, D3, (Calcium 600 with Vitamin D3) 600 mg(1,500mg) -400 unit chew Take  by mouth.  lisinopril (PRINIVIL, ZESTRIL) 10 mg tablet Take 1 Tab by mouth two (2) times a day. (Patient taking differently: Take 5 mg by mouth two (2) times a day.) 60 Tab 3     No current facility-administered medications on file prior to visit. Allergies: Allergies   Allergen Reactions    Pcn [Penicillins] Other (comments)     Paralyzes Left side        OBJECTIVE:    Vitals:   Visit Vitals  [facility] 07/19/2011       Physical Examination:    General:  alert, cooperative, no distress   Abdomen: soft, bowel sounds active, non-tender   Incision: healing well, staples removed, steris applied   Pelvic: NEFG, Spec exam revealed vaginal cuff intact, BME revealed vaginal cuff intact, nontender   Rectal: not done   Extremity:   extremities normal, atraumatic, no cyanosis or edema     IMPRESSION/PLAN:    Pauline Metz is Doing well postoperatively. .  She has a working diagnosis of Stage IAG1 endometrial cancer   -reviewed her pathology and favorable prognosis with low risk of recurrence and no need for adjuvant treatment   -reviewed surveillance strategy including exams every 4 months x 2 years, then every 6 months x 3 years then annually   -discussed s/sx of recurrence   -all of patients questions and concerns address   -will plan for f/u in 4 months    The operative procedures and clinical results have been reviewed with the patient. Implications of diagnosis discussed at length.   All questions answered. I will see the patient back in 2 week(s). The patient is advised to call our office with any problems or concerns.     Matt Bridges DO  Gynecologic Oncology  11/18/2020/7:27 AM

## 2020-11-20 ENCOUNTER — OFFICE VISIT (OUTPATIENT)
Dept: ONCOLOGY | Age: 55
End: 2020-11-20
Payer: COMMERCIAL

## 2020-11-20 VITALS
BODY MASS INDEX: 48.82 KG/M2 | OXYGEN SATURATION: 98 % | WEIGHT: 293 LBS | DIASTOLIC BLOOD PRESSURE: 82 MMHG | SYSTOLIC BLOOD PRESSURE: 122 MMHG | HEIGHT: 65 IN | HEART RATE: 81 BPM | TEMPERATURE: 97.5 F

## 2020-11-20 DIAGNOSIS — C54.1 ENDOMETRIAL CANCER (HCC): Primary | ICD-10-CM

## 2020-11-20 PROCEDURE — 99024 POSTOP FOLLOW-UP VISIT: CPT | Performed by: OBSTETRICS & GYNECOLOGY

## 2020-11-20 NOTE — LETTER
11/20/20 Patient: Madhu Ceballos YOB: 1965 Date of Visit: 11/20/2020 Angélica Kenny MD 
[address] [place] [address] [place] [address] VIA Facsimile: [phone] Dear Angélica Kenny MD, Thank you for referring Ms. Nieves Singer to [facility] for evaluation. My notes for this consultation are attached. If you have questions, please do not hesitate to call me. I look forward to following your patient along with you. Sincerely, Ricky Ren MD

## 2020-11-20 NOTE — PROGRESS NOTES
Solmon Mcburney is a 62 y.o. female presents in office for follow up results    Chief Complaint   Patient presents with    Follow-up      Pt has no complaints  Do you have any unusual vaginal bleeding, discharge or irritation? no  Do you have any changes in your bowel movements? no  Have you been experiencing nausea or vomiting? no  Have you been experiencing any continuous or worsening abdominal pain? no  Any urinary burning? no    Visit Vitals  /82 (BP 1 Location: Left arm, BP Patient Position: Sitting)   Pulse 81   Temp 97.5 °F (36.4 °C) (Oral)   Ht 5' 5\" (1.651 m)   Wt 297 lb 9.6 oz (135 kg)   SpO2 98%   BMI 49.52 kg/m²         1. Have you been to the ER, urgent care clinic since your last visit? Hospitalized since your last visit? No    2. Have you seen or consulted any other health care providers outside of the [address] [place] [address] since your last visit? Include any pap smears or colon screening.  No    3 most recent PHQ Screens 11/20/2020   Little interest or pleasure in doing things Not at all   Feeling down, depressed, irritable, or hopeless Not at all   Total Score PHQ 2 0           Learning Assessment 10/23/2020   PRIMARY LEARNER Patient   BARRIERS PRIMARY LEARNER NONE   CO-LEARNER CAREGIVER No   PRIMARY LANGUAGE ENGLISH   LEARNER PREFERENCE PRIMARY DEMONSTRATION   ANSWERED BY Patient   RELATIONSHIP SELF

## 2020-12-01 ENCOUNTER — OFFICE VISIT (OUTPATIENT)
Dept: ONCOLOGY | Age: 55
End: 2020-12-01
Payer: COMMERCIAL

## 2020-12-01 VITALS
WEIGHT: 293 LBS | SYSTOLIC BLOOD PRESSURE: 114 MMHG | HEIGHT: 65 IN | OXYGEN SATURATION: 97 % | BODY MASS INDEX: 48.82 KG/M2 | DIASTOLIC BLOOD PRESSURE: 71 MMHG | RESPIRATION RATE: 16 BRPM | TEMPERATURE: 97 F | HEART RATE: 77 BPM

## 2020-12-01 DIAGNOSIS — C54.1 ENDOMETRIAL CANCER (HCC): Primary | ICD-10-CM

## 2020-12-01 PROCEDURE — 99024 POSTOP FOLLOW-UP VISIT: CPT | Performed by: OBSTETRICS & GYNECOLOGY

## 2020-12-01 NOTE — LETTER
NOTIFICATION OF RETURN TO WORK / SCHOOL 
 
12/1/2020 1:30 PM 
 
Ms. Mariza George [address] [place] [address] Sandra Carrillo To Whom It May Concern: Marzia George was under the care of [facility] from 11/5/2020 to 12/20/2020. She will be able to return to work/school on 12/21/2020 with no restrictions. If there are questions or concerns please have the patient contact our office. Sincerely, Orlando Shelton MD

## 2020-12-01 NOTE — LETTER
12/1/20 Patient: Griffin Napier YOB: 1965 Date of Visit: 12/1/2020 Lashaun Wasserman MD 
[address] [place] [address] VIA Facsimile: [phone] Dear Lashaun Wasserman MD, [facility] you [facility] My notes for this consultation are attached. If you have questions, please do not hesitate to call me. I look forward to following your patient along with you. Sincerely, Bre Andres MD

## 2020-12-01 NOTE — PROGRESS NOTES
Serene Padilla is a 62 y.o. female presents in office for 4 week post op. Chief Complaint   Patient presents with    Surgical Follow-up      Pt has no complaints  Do you have any unusual vaginal bleeding, discharge or irritation? No  Do you have any changes in your bowel movements? No  Have you been experiencing nausea or vomiting? No  Have you been experiencing any continuous or worsening abdominal pain? No  Any urinary burning? No    Visit Vitals  /71 (BP 1 Location: Right arm, BP Patient Position: Sitting)   Pulse 77   Temp 97 °F (36.1 °C) (Oral)   Resp 16   Ht 5' 5\" (1.651 m)   Wt 134.3 kg (296 lb)   SpO2 97%   BMI 49.26 kg/m²         1. Have you been to the ER, urgent care clinic since your last visit? Hospitalized since your last visit? No    2. Have you seen or consulted any other health care providers outside of the [address] [place] [address] since your last visit? Include any pap smears or colon screening.  No    3 most recent PHQ Screens 11/20/2020   Little interest or pleasure in doing things Not at all   Feeling down, depressed, irritable, or hopeless Not at all   Total Score PHQ 2 0       Learning Assessment 10/23/2020   PRIMARY LEARNER Patient   BARRIERS PRIMARY LEARNER NONE   CO-LEARNER CAREGIVER No   PRIMARY LANGUAGE ENGLISH   LEARNER PREFERENCE PRIMARY DEMONSTRATION   ANSWERED BY Patient   RELATIONSHIP SELF

## 2020-12-01 NOTE — PROGRESS NOTES
[facility] [facility]  [address] [place] [address], [address], [facility] [facility]  [address] [facility], [address] [place] [address]  [place], [address]   [phone]  [employer] [facility] DO        Patient ID:  Name: Farnaz Stephenson  MRM: [id number]  : 1965/54 y.o. Date: 2020    SUBJECTIVE:    This is a 62 y.o.  female with h/o stage IAG1 endometrial cancer s/p Laparoscopic lysis of adhesions followed by robotic-assisted total laparoscopic hysterectomy, right salpingo-oophorectomy, staging, washings. on 2020. No bleeding. Still having some discomfort. Her pathology revealed      A: UTERUS, RIGHT TUBE AND OVARY, RESECTION:   ENDOMETRIAL CARCINOMA. HISTOLOGIC TYPE: ENDOMETRIOID   HISTOLOGIC GRADE: FIGO I.   MYOMETRIAL INVASION: NOT IDENTIFIED (SEE MICROSCOPIC DESCRIPTION). UTERINE SEROSAL INVOLVEMENT: NOT IDENTIFIED. CERVICAL STROMAL INVOLVEMENT: NOT IDENTIFIED. OTHER ORGAN INVOLVEMENT: NOT IDENTIFIED. LYMPHOVASCULAR SPACE INVASION: NOT IDENTIFIED. ESTROGEN RECEPTOR: PENDING. PROGESTERONE RECEPTOR: PENDING. MISMATCH REPAIR: PENDING. OTHER FINDINGS:   CERVIX: MILD CHRONIC AND ACUTE INFLAMMATION. MYOMETRIUM: LEIOMYOMATA. ADENOMYOSIS. UTERINE TUBE: NOT IDENTIFIED. OVARY: CORTICAL ADHESIONS AND CORTICAL SEROUS EPITHELIAL   CYSTS. AJCC STAGE (8TH EDITION): pT1a N0.   B: LYMPH NODE, PELVIS, LEFT ASPECT, SENTINEL, BIOPSY:   ONE LYMPH NODE WITH NO METASTATIC CARCINOMA (0/1). C: LYMPH NODE, PELVIS, RIGHT ASPECT, SENTINEL, BIOPSY:   ADIPOSE CONNECTIVE TISSUE WITH NO METASTATIC CARCINOMA. Medications:     Current Outpatient Medications on File Prior to Visit   Medication Sig Dispense Refill    acetaminophen/diphenhydramine (TYLENOL PM PO) Take  by mouth.  aspirin (ASPIRIN) 325 mg tablet Take 325 mg by mouth daily.  POTASSIUM CHLORIDE by Does Not Apply route.       Urigfuht-Hxmq-Tdpuew-Hyalur Ac 841-913-82-2 mg cap Take  by mouth.  Calcium-Cholecalciferol, D3, (Calcium 600 with Vitamin D3) 600 mg(1,500mg) -400 unit chew Take  by mouth.  lisinopril (PRINIVIL, ZESTRIL) 10 mg tablet Take 1 Tab by mouth two (2) times a day. (Patient taking differently: Take 5 mg by mouth two (2) times a day.) 60 Tab 3     No current facility-administered medications on file prior to visit. Allergies:      Allergies   Allergen Reactions    Pcn [Penicillins] Other (comments)     Paralyzes Left side        OBJECTIVE:    Vitals:   Visit Vitals  /71 (BP 1 Location: Right arm, BP Patient Position: Sitting)   Pulse 77   Temp 97 °F (36.1 °C) (Oral)   Resp 16   Ht 5' 5\" (1.651 m)   Wt 134.3 kg (296 lb)   LMP 07/19/2011   SpO2 97%   BMI 49.26 kg/m²       Physical Examination:    General:  alert, cooperative, no distress   Abdomen: soft, bowel sounds active, non-tender   Incision: healing well, staples removed, steris applied   Pelvic: NEFG, Spec exam revealed vaginal cuff intact, BME revealed vaginal cuff intact, nontender   Rectal: not done   Extremity:   extremities normal, atraumatic, no cyanosis or edema     IMPRESSION/PLAN:   Stage IAG1 endometrial cancer   -reviewed her pathology and favorable prognosis with low risk of recurrence and no need for adjuvant treatment   -reviewed surveillance strategy including exams every 4 months x 2 years, then every 6 months x 3 years then annually   -discussed s/sx of recurrence   -pelvic exam performed today   -all of patients questions and concerns address   -will plan for f/u in 4 months        [address] [place] [address]  Gynecologic Oncology  12/1/2020/7:27 AM

## 2021-10-28 ENCOUNTER — TELEPHONE (OUTPATIENT)
Dept: ONCOLOGY | Age: 56
End: 2021-10-28

## 2021-10-28 NOTE — TELEPHONE ENCOUNTER
Left a message advising the patient to contact the office to get scheduled for a follow up appointment. I reviewed my in basket and patients chart and noticed patient was overdue for a follow up with Lawrence Phelan. office number was left on voicemail.

## (undated) DEVICE — SCISSORS ENDOSCP DIA5MM CRV MPLR CAUT W/ RATCH HNDL

## (undated) DEVICE — PREP SKN CHLRAPRP APL 26ML STR --

## (undated) DEVICE — SOL IRRIGATION INJ NACL 0.9% 500ML BTL

## (undated) DEVICE — STERILE POLYISOPRENE POWDER-FREE SURGICAL GLOVES: Brand: PROTEXIS

## (undated) DEVICE — TRAP MUCUS SPECIMEN 40ML -- MEDICHOICE

## (undated) DEVICE — SUT MONOCRYL PLUS UD 4-0 --

## (undated) DEVICE — DRAPE XR C ARM 41X74IN LF --

## (undated) DEVICE — SUTURE PDS II SZ 2-0 L27IN ABSRB VLT L36MM CT-1 1/2 CIR Z339H

## (undated) DEVICE — TOWEL,OR,DSP,ST,BLUE,STD,4/PK,20PK/CS: Brand: MEDLINE

## (undated) DEVICE — SYR 20ML LL STRL LF --

## (undated) DEVICE — YANKAUER,TAPERED BULBOUS TIP,W/O VENT: Brand: MEDLINE

## (undated) DEVICE — SUTURE VCRL SZ 3-0 L36IN ABSRB UD L36MM CT-1 1/2 CIR J944H

## (undated) DEVICE — TRAP,MUCUS SPECIMEN,40CC: Brand: MEDLINE

## (undated) DEVICE — REM POLYHESIVE ADULT PATIENT RETURN ELECTRODE: Brand: VALLEYLAB

## (undated) DEVICE — 40595 XL TRENDELENBURG POSITIONING KIT: Brand: 40595 XL TRENDELENBURG POSITIONING KIT

## (undated) DEVICE — SUT MCRYL + 3-0 27IN PS1 UD --

## (undated) DEVICE — BLADELESS OBTURATOR: Brand: WECK VISTA

## (undated) DEVICE — ARM DRAPE

## (undated) DEVICE — GARMENT,MEDLINE,DVT,INT,CALF,MED, GEN2: Brand: MEDLINE

## (undated) DEVICE — SYR 10ML LUER LOK 1/5ML GRAD --

## (undated) DEVICE — NEEDLE HYPO 21GA L1.5IN INTRAMUSCULAR S STL LATCH BVL UP

## (undated) DEVICE — COVER MPLR TIP CRV SCIS ACC DA VINCI

## (undated) DEVICE — SUTURE PDS II SZ 0 L27IN ABSRB VLT L36MM CT-1 1/2 CIR Z340H

## (undated) DEVICE — SUTURE PDS II SZ 1 L96IN ABSRB VLT TP-1 L65MM 1/2 CIR Z880G

## (undated) DEVICE — DISPOSABLE SUCTION/IRRIGATOR TUBE SET WITH TIP: Brand: AHTO

## (undated) DEVICE — MEDI-VAC YANK SUCT HNDL W/TPRD BULBOUS TIP: Brand: CARDINAL HEALTH

## (undated) DEVICE — GYN ONCOLOGY: Brand: MEDLINE INDUSTRIES, INC.

## (undated) DEVICE — COLUMN DRAPE

## (undated) DEVICE — TRI-LUMEN FILTERED TUBE SET WITH ACTIVATED CHARCOAL FILTER: Brand: AIRSEAL

## (undated) DEVICE — INTENDED FOR TISSUE SEPARATION, AND OTHER PROCEDURES THAT REQUIRE A SHARP SURGICAL BLADE TO PUNCTURE OR CUT.: Brand: BARD-PARKER ® CARBON RIB-BACK BLADES

## (undated) DEVICE — SOLUTION LACTATED RINGERS INJECTION USP

## (undated) DEVICE — AIRSEAL 5 MM ACCESS PORT AND LOW PROFILE OBTURATOR WITH BLADELESS OPTICAL TIP, 100 MM LENGTH: Brand: AIRSEAL

## (undated) DEVICE — SYSTEM ES CUP DIA3CM PNEUMO OCCL BLLN DISP FOR CLIN POS

## (undated) DEVICE — STERILE POLYISOPRENE POWDER-FREE SURGICAL GLOVES WITH EMOLLIENT COATING: Brand: PROTEXIS

## (undated) DEVICE — ROBOTIC PACK: Brand: MEDLINE INDUSTRIES, INC.

## (undated) DEVICE — SEAL UNIV 5-8MM DISP BX/10 -- DA VINCI XI - SNGL USE

## (undated) DEVICE — VISUALIZATION SYSTEM: Brand: CLEARIFY

## (undated) DEVICE — TIP IU L10CM DIA6.7MM GRN SIL FLX DISP RUMI II

## (undated) DEVICE — STRIP SKIN CLSR W1XL5IN NYL REINF CURAD

## (undated) DEVICE — OPTIFOAM GENTLE SA, POSTOP, 4X12: Brand: MEDLINE

## (undated) DEVICE — TUBING, SUCTION, 1/4" X 12', STRAIGHT: Brand: MEDLINE

## (undated) DEVICE — TOTAL TRAY, DB, 100% SILI FOLEY, 16FR 10: Brand: MEDLINE

## (undated) DEVICE — BLADE ELECTRODE: Brand: EDGE

## (undated) DEVICE — SEALER TISS L20CM DIA13MM ADV BPLR L CRV JAW OPN APPRCH

## (undated) DEVICE — Device